# Patient Record
Sex: MALE | Race: ASIAN | URBAN - METROPOLITAN AREA
[De-identification: names, ages, dates, MRNs, and addresses within clinical notes are randomized per-mention and may not be internally consistent; named-entity substitution may affect disease eponyms.]

---

## 2024-03-16 ENCOUNTER — INPATIENT (INPATIENT)
Facility: HOSPITAL | Age: 74
LOS: 2 days | Discharge: HOME | DRG: 254 | End: 2024-03-19
Attending: STUDENT IN AN ORGANIZED HEALTH CARE EDUCATION/TRAINING PROGRAM | Admitting: INTERNAL MEDICINE
Payer: MEDICAID

## 2024-03-16 VITALS
HEART RATE: 92 BPM | WEIGHT: 128.09 LBS | SYSTOLIC BLOOD PRESSURE: 140 MMHG | DIASTOLIC BLOOD PRESSURE: 86 MMHG | RESPIRATION RATE: 20 BRPM | OXYGEN SATURATION: 95 % | TEMPERATURE: 98 F

## 2024-03-16 DIAGNOSIS — R07.9 CHEST PAIN, UNSPECIFIED: ICD-10-CM

## 2024-03-16 LAB
A1C WITH ESTIMATED AVERAGE GLUCOSE RESULT: 5.9 % — HIGH (ref 4–5.6)
ALBUMIN SERPL ELPH-MCNC: 4.8 G/DL — SIGNIFICANT CHANGE UP (ref 3.5–5.2)
ALP SERPL-CCNC: 95 U/L — SIGNIFICANT CHANGE UP (ref 30–115)
ALT FLD-CCNC: 28 U/L — SIGNIFICANT CHANGE UP (ref 0–41)
ANION GAP SERPL CALC-SCNC: 11 MMOL/L — SIGNIFICANT CHANGE UP (ref 7–14)
ANION GAP SERPL CALC-SCNC: 9 MMOL/L — SIGNIFICANT CHANGE UP (ref 7–14)
AST SERPL-CCNC: 33 U/L — SIGNIFICANT CHANGE UP (ref 0–41)
BASE EXCESS BLDV CALC-SCNC: 4.7 MMOL/L — HIGH (ref -2–3)
BASOPHILS # BLD AUTO: 0.01 K/UL — SIGNIFICANT CHANGE UP (ref 0–0.2)
BASOPHILS NFR BLD AUTO: 0.2 % — SIGNIFICANT CHANGE UP (ref 0–1)
BILIRUB SERPL-MCNC: 0.5 MG/DL — SIGNIFICANT CHANGE UP (ref 0.2–1.2)
BUN SERPL-MCNC: 11 MG/DL — SIGNIFICANT CHANGE UP (ref 10–20)
BUN SERPL-MCNC: 8 MG/DL — LOW (ref 10–20)
CA-I SERPL-SCNC: 1.19 MMOL/L — SIGNIFICANT CHANGE UP (ref 1.15–1.33)
CALCIUM SERPL-MCNC: 9 MG/DL — SIGNIFICANT CHANGE UP (ref 8.4–10.5)
CALCIUM SERPL-MCNC: 9.1 MG/DL — SIGNIFICANT CHANGE UP (ref 8.4–10.5)
CHLORIDE SERPL-SCNC: 101 MMOL/L — SIGNIFICANT CHANGE UP (ref 98–110)
CHLORIDE SERPL-SCNC: 102 MMOL/L — SIGNIFICANT CHANGE UP (ref 98–110)
CK MB CFR SERPL CALC: 2.9 NG/ML — SIGNIFICANT CHANGE UP (ref 0.6–6.3)
CK SERPL-CCNC: 78 U/L — SIGNIFICANT CHANGE UP (ref 0–225)
CO2 SERPL-SCNC: 28 MMOL/L — SIGNIFICANT CHANGE UP (ref 17–32)
CO2 SERPL-SCNC: 29 MMOL/L — SIGNIFICANT CHANGE UP (ref 17–32)
CREAT SERPL-MCNC: 0.5 MG/DL — LOW (ref 0.7–1.5)
CREAT SERPL-MCNC: 0.6 MG/DL — LOW (ref 0.7–1.5)
EGFR: 101 ML/MIN/1.73M2 — SIGNIFICANT CHANGE UP
EGFR: 106 ML/MIN/1.73M2 — SIGNIFICANT CHANGE UP
EOSINOPHIL # BLD AUTO: 0.12 K/UL — SIGNIFICANT CHANGE UP (ref 0–0.7)
EOSINOPHIL NFR BLD AUTO: 2 % — SIGNIFICANT CHANGE UP (ref 0–8)
ESTIMATED AVERAGE GLUCOSE: 123 MG/DL — HIGH (ref 68–114)
GAS PNL BLDV: 138 MMOL/L — SIGNIFICANT CHANGE UP (ref 136–145)
GAS PNL BLDV: SIGNIFICANT CHANGE UP
GLUCOSE BLDC GLUCOMTR-MCNC: 103 MG/DL — HIGH (ref 70–99)
GLUCOSE BLDC GLUCOMTR-MCNC: 104 MG/DL — HIGH (ref 70–99)
GLUCOSE BLDC GLUCOMTR-MCNC: 119 MG/DL — HIGH (ref 70–99)
GLUCOSE BLDC GLUCOMTR-MCNC: 138 MG/DL — HIGH (ref 70–99)
GLUCOSE SERPL-MCNC: 103 MG/DL — HIGH (ref 70–99)
GLUCOSE SERPL-MCNC: 117 MG/DL — HIGH (ref 70–99)
HCO3 BLDV-SCNC: 31 MMOL/L — HIGH (ref 22–29)
HCT VFR BLD CALC: 38.9 % — LOW (ref 42–52)
HCT VFR BLD CALC: 41.6 % — LOW (ref 42–52)
HCT VFR BLDA CALC: 42 % — SIGNIFICANT CHANGE UP (ref 39–51)
HCV AB S/CO SERPL IA: 0.04 COI — SIGNIFICANT CHANGE UP
HCV AB SERPL-IMP: SIGNIFICANT CHANGE UP
HGB BLD CALC-MCNC: 14 G/DL — SIGNIFICANT CHANGE UP (ref 12.6–17.4)
HGB BLD-MCNC: 13.4 G/DL — LOW (ref 14–18)
HGB BLD-MCNC: 13.8 G/DL — LOW (ref 14–18)
IMM GRANULOCYTES NFR BLD AUTO: 0.2 % — SIGNIFICANT CHANGE UP (ref 0.1–0.3)
LACTATE BLDV-MCNC: 1.4 MMOL/L — SIGNIFICANT CHANGE UP (ref 0.5–2)
LIDOCAIN IGE QN: 51 U/L — SIGNIFICANT CHANGE UP (ref 7–60)
LYMPHOCYTES # BLD AUTO: 1.59 K/UL — SIGNIFICANT CHANGE UP (ref 1.2–3.4)
LYMPHOCYTES # BLD AUTO: 26.1 % — SIGNIFICANT CHANGE UP (ref 20.5–51.1)
MCHC RBC-ENTMCNC: 31.4 PG — HIGH (ref 27–31)
MCHC RBC-ENTMCNC: 31.8 PG — HIGH (ref 27–31)
MCHC RBC-ENTMCNC: 33.2 G/DL — SIGNIFICANT CHANGE UP (ref 32–37)
MCHC RBC-ENTMCNC: 34.4 G/DL — SIGNIFICANT CHANGE UP (ref 32–37)
MCV RBC AUTO: 92.4 FL — SIGNIFICANT CHANGE UP (ref 80–94)
MCV RBC AUTO: 94.8 FL — HIGH (ref 80–94)
MONOCYTES # BLD AUTO: 0.54 K/UL — SIGNIFICANT CHANGE UP (ref 0.1–0.6)
MONOCYTES NFR BLD AUTO: 8.9 % — SIGNIFICANT CHANGE UP (ref 1.7–9.3)
NEUTROPHILS # BLD AUTO: 3.82 K/UL — SIGNIFICANT CHANGE UP (ref 1.4–6.5)
NEUTROPHILS NFR BLD AUTO: 62.6 % — SIGNIFICANT CHANGE UP (ref 42.2–75.2)
NRBC # BLD: 0 /100 WBCS — SIGNIFICANT CHANGE UP (ref 0–0)
NRBC # BLD: 0 /100 WBCS — SIGNIFICANT CHANGE UP (ref 0–0)
NT-PROBNP SERPL-SCNC: 83 PG/ML — SIGNIFICANT CHANGE UP (ref 0–300)
PCO2 BLDV: 51 MMHG — SIGNIFICANT CHANGE UP (ref 42–55)
PH BLDV: 7.39 — SIGNIFICANT CHANGE UP (ref 7.32–7.43)
PLATELET # BLD AUTO: 179 K/UL — SIGNIFICANT CHANGE UP (ref 130–400)
PLATELET # BLD AUTO: 184 K/UL — SIGNIFICANT CHANGE UP (ref 130–400)
PMV BLD: 9.7 FL — SIGNIFICANT CHANGE UP (ref 7.4–10.4)
PMV BLD: 9.8 FL — SIGNIFICANT CHANGE UP (ref 7.4–10.4)
PO2 BLDV: 51 MMHG — HIGH (ref 25–45)
POTASSIUM BLDV-SCNC: 3.4 MMOL/L — LOW (ref 3.5–5.1)
POTASSIUM SERPL-MCNC: 3.7 MMOL/L — SIGNIFICANT CHANGE UP (ref 3.5–5)
POTASSIUM SERPL-MCNC: 4.1 MMOL/L — SIGNIFICANT CHANGE UP (ref 3.5–5)
POTASSIUM SERPL-SCNC: 3.7 MMOL/L — SIGNIFICANT CHANGE UP (ref 3.5–5)
POTASSIUM SERPL-SCNC: 4.1 MMOL/L — SIGNIFICANT CHANGE UP (ref 3.5–5)
PROT SERPL-MCNC: 7.8 G/DL — SIGNIFICANT CHANGE UP (ref 6–8)
RBC # BLD: 4.21 M/UL — LOW (ref 4.7–6.1)
RBC # BLD: 4.39 M/UL — LOW (ref 4.7–6.1)
RBC # FLD: 12.7 % — SIGNIFICANT CHANGE UP (ref 11.5–14.5)
RBC # FLD: 12.8 % — SIGNIFICANT CHANGE UP (ref 11.5–14.5)
SAO2 % BLDV: 82.7 % — SIGNIFICANT CHANGE UP (ref 67–88)
SODIUM SERPL-SCNC: 140 MMOL/L — SIGNIFICANT CHANGE UP (ref 135–146)
SODIUM SERPL-SCNC: 140 MMOL/L — SIGNIFICANT CHANGE UP (ref 135–146)
TROPONIN T, HIGH SENSITIVITY RESULT: 10 NG/L — SIGNIFICANT CHANGE UP (ref 6–21)
TROPONIN T, HIGH SENSITIVITY RESULT: 11 NG/L — SIGNIFICANT CHANGE UP (ref 6–21)
TROPONIN T, HIGH SENSITIVITY RESULT: 12 NG/L — SIGNIFICANT CHANGE UP (ref 6–21)
WBC # BLD: 6.09 K/UL — SIGNIFICANT CHANGE UP (ref 4.8–10.8)
WBC # BLD: 8.02 K/UL — SIGNIFICANT CHANGE UP (ref 4.8–10.8)
WBC # FLD AUTO: 6.09 K/UL — SIGNIFICANT CHANGE UP (ref 4.8–10.8)
WBC # FLD AUTO: 8.02 K/UL — SIGNIFICANT CHANGE UP (ref 4.8–10.8)

## 2024-03-16 PROCEDURE — 86803 HEPATITIS C AB TEST: CPT

## 2024-03-16 PROCEDURE — 71275 CT ANGIOGRAPHY CHEST: CPT | Mod: 26,MC

## 2024-03-16 PROCEDURE — 74018 RADEX ABDOMEN 1 VIEW: CPT | Mod: 26

## 2024-03-16 PROCEDURE — 99285 EMERGENCY DEPT VISIT HI MDM: CPT

## 2024-03-16 PROCEDURE — 99222 1ST HOSP IP/OBS MODERATE 55: CPT

## 2024-03-16 PROCEDURE — 80048 BASIC METABOLIC PNL TOTAL CA: CPT

## 2024-03-16 PROCEDURE — 83735 ASSAY OF MAGNESIUM: CPT

## 2024-03-16 PROCEDURE — 82962 GLUCOSE BLOOD TEST: CPT

## 2024-03-16 PROCEDURE — 82553 CREATINE MB FRACTION: CPT

## 2024-03-16 PROCEDURE — 85025 COMPLETE CBC W/AUTO DIFF WBC: CPT

## 2024-03-16 PROCEDURE — 93010 ELECTROCARDIOGRAM REPORT: CPT

## 2024-03-16 PROCEDURE — 99223 1ST HOSP IP/OBS HIGH 75: CPT

## 2024-03-16 PROCEDURE — 85027 COMPLETE CBC AUTOMATED: CPT

## 2024-03-16 PROCEDURE — 93005 ELECTROCARDIOGRAM TRACING: CPT

## 2024-03-16 PROCEDURE — 71045 X-RAY EXAM CHEST 1 VIEW: CPT | Mod: 26

## 2024-03-16 PROCEDURE — 36415 COLL VENOUS BLD VENIPUNCTURE: CPT

## 2024-03-16 PROCEDURE — 80053 COMPREHEN METABOLIC PANEL: CPT

## 2024-03-16 PROCEDURE — 93306 TTE W/DOPPLER COMPLETE: CPT

## 2024-03-16 PROCEDURE — 74174 CTA ABD&PLVS W/CONTRAST: CPT | Mod: 26,MC

## 2024-03-16 PROCEDURE — 74018 RADEX ABDOMEN 1 VIEW: CPT

## 2024-03-16 PROCEDURE — 83036 HEMOGLOBIN GLYCOSYLATED A1C: CPT

## 2024-03-16 PROCEDURE — 82550 ASSAY OF CK (CPK): CPT

## 2024-03-16 PROCEDURE — 84484 ASSAY OF TROPONIN QUANT: CPT

## 2024-03-16 RX ORDER — ATORVASTATIN CALCIUM 80 MG/1
20 TABLET, FILM COATED ORAL AT BEDTIME
Refills: 0 | Status: DISCONTINUED | OUTPATIENT
Start: 2024-03-16 | End: 2024-03-19

## 2024-03-16 RX ORDER — ACETAMINOPHEN 500 MG
650 TABLET ORAL EVERY 6 HOURS
Refills: 0 | Status: DISCONTINUED | OUTPATIENT
Start: 2024-03-16 | End: 2024-03-19

## 2024-03-16 RX ORDER — PANTOPRAZOLE SODIUM 20 MG/1
40 TABLET, DELAYED RELEASE ORAL
Refills: 0 | Status: DISCONTINUED | OUTPATIENT
Start: 2024-03-16 | End: 2024-03-19

## 2024-03-16 RX ORDER — SODIUM CHLORIDE 9 MG/ML
1000 INJECTION, SOLUTION INTRAVENOUS
Refills: 0 | Status: DISCONTINUED | OUTPATIENT
Start: 2024-03-16 | End: 2024-03-19

## 2024-03-16 RX ORDER — DEXTROSE 50 % IN WATER 50 %
15 SYRINGE (ML) INTRAVENOUS ONCE
Refills: 0 | Status: DISCONTINUED | OUTPATIENT
Start: 2024-03-16 | End: 2024-03-19

## 2024-03-16 RX ORDER — INSULIN LISPRO 100/ML
VIAL (ML) SUBCUTANEOUS
Refills: 0 | Status: DISCONTINUED | OUTPATIENT
Start: 2024-03-16 | End: 2024-03-19

## 2024-03-16 RX ORDER — NITROGLYCERIN 6.5 MG
0.4 CAPSULE, EXTENDED RELEASE ORAL
Refills: 0 | Status: DISCONTINUED | OUTPATIENT
Start: 2024-03-16 | End: 2024-03-16

## 2024-03-16 RX ORDER — SENNA PLUS 8.6 MG/1
2 TABLET ORAL AT BEDTIME
Refills: 0 | Status: DISCONTINUED | OUTPATIENT
Start: 2024-03-16 | End: 2024-03-19

## 2024-03-16 RX ORDER — TAMSULOSIN HYDROCHLORIDE 0.4 MG/1
1 CAPSULE ORAL
Refills: 0 | DISCHARGE

## 2024-03-16 RX ORDER — TAMSULOSIN HYDROCHLORIDE 0.4 MG/1
0.4 CAPSULE ORAL AT BEDTIME
Refills: 0 | Status: DISCONTINUED | OUTPATIENT
Start: 2024-03-16 | End: 2024-03-19

## 2024-03-16 RX ORDER — LANOLIN ALCOHOL/MO/W.PET/CERES
3 CREAM (GRAM) TOPICAL AT BEDTIME
Refills: 0 | Status: DISCONTINUED | OUTPATIENT
Start: 2024-03-16 | End: 2024-03-19

## 2024-03-16 RX ORDER — DEXTROSE 50 % IN WATER 50 %
25 SYRINGE (ML) INTRAVENOUS ONCE
Refills: 0 | Status: DISCONTINUED | OUTPATIENT
Start: 2024-03-16 | End: 2024-03-19

## 2024-03-16 RX ORDER — LORATADINE 10 MG/1
10 TABLET ORAL DAILY
Refills: 0 | Status: DISCONTINUED | OUTPATIENT
Start: 2024-03-16 | End: 2024-03-19

## 2024-03-16 RX ORDER — HYDROXYZINE HCL 10 MG
1 TABLET ORAL
Refills: 0 | DISCHARGE

## 2024-03-16 RX ORDER — ATORVASTATIN CALCIUM 80 MG/1
1 TABLET, FILM COATED ORAL
Refills: 0 | DISCHARGE

## 2024-03-16 RX ORDER — ONDANSETRON 8 MG/1
4 TABLET, FILM COATED ORAL EVERY 8 HOURS
Refills: 0 | Status: DISCONTINUED | OUTPATIENT
Start: 2024-03-16 | End: 2024-03-19

## 2024-03-16 RX ORDER — POLYETHYLENE GLYCOL 3350 17 G/17G
17 POWDER, FOR SOLUTION ORAL EVERY 12 HOURS
Refills: 0 | Status: DISCONTINUED | OUTPATIENT
Start: 2024-03-16 | End: 2024-03-19

## 2024-03-16 RX ORDER — AMLODIPINE BESYLATE 2.5 MG/1
1 TABLET ORAL
Refills: 0 | DISCHARGE

## 2024-03-16 RX ORDER — SIMETHICONE 80 MG/1
80 TABLET, CHEWABLE ORAL EVERY 6 HOURS
Refills: 0 | Status: COMPLETED | OUTPATIENT
Start: 2024-03-16 | End: 2024-03-17

## 2024-03-16 RX ORDER — FAMOTIDINE 10 MG/ML
20 INJECTION INTRAVENOUS ONCE
Refills: 0 | Status: COMPLETED | OUTPATIENT
Start: 2024-03-16 | End: 2024-03-16

## 2024-03-16 RX ORDER — FAMOTIDINE 10 MG/ML
20 INJECTION INTRAVENOUS
Refills: 0 | Status: DISCONTINUED | OUTPATIENT
Start: 2024-03-16 | End: 2024-03-16

## 2024-03-16 RX ORDER — GLUCAGON INJECTION, SOLUTION 0.5 MG/.1ML
1 INJECTION, SOLUTION SUBCUTANEOUS ONCE
Refills: 0 | Status: DISCONTINUED | OUTPATIENT
Start: 2024-03-16 | End: 2024-03-19

## 2024-03-16 RX ORDER — GABAPENTIN 400 MG/1
100 CAPSULE ORAL
Refills: 0 | Status: DISCONTINUED | OUTPATIENT
Start: 2024-03-16 | End: 2024-03-19

## 2024-03-16 RX ORDER — LORATADINE 10 MG/1
1 TABLET ORAL
Refills: 0 | DISCHARGE

## 2024-03-16 RX ORDER — DEXTROSE 50 % IN WATER 50 %
12.5 SYRINGE (ML) INTRAVENOUS ONCE
Refills: 0 | Status: DISCONTINUED | OUTPATIENT
Start: 2024-03-16 | End: 2024-03-19

## 2024-03-16 RX ORDER — FAMOTIDINE 10 MG/ML
1 INJECTION INTRAVENOUS
Refills: 0 | DISCHARGE

## 2024-03-16 RX ORDER — AMLODIPINE BESYLATE 2.5 MG/1
2.5 TABLET ORAL EVERY 24 HOURS
Refills: 0 | Status: DISCONTINUED | OUTPATIENT
Start: 2024-03-16 | End: 2024-03-19

## 2024-03-16 RX ORDER — PREGABALIN 225 MG/1
1000 CAPSULE ORAL DAILY
Refills: 0 | Status: DISCONTINUED | OUTPATIENT
Start: 2024-03-16 | End: 2024-03-19

## 2024-03-16 RX ORDER — ENOXAPARIN SODIUM 100 MG/ML
40 INJECTION SUBCUTANEOUS EVERY 24 HOURS
Refills: 0 | Status: DISCONTINUED | OUTPATIENT
Start: 2024-03-16 | End: 2024-03-19

## 2024-03-16 RX ORDER — MORPHINE SULFATE 50 MG/1
4 CAPSULE, EXTENDED RELEASE ORAL ONCE
Refills: 0 | Status: DISCONTINUED | OUTPATIENT
Start: 2024-03-16 | End: 2024-03-16

## 2024-03-16 RX ORDER — ASPIRIN/CALCIUM CARB/MAGNESIUM 324 MG
81 TABLET ORAL DAILY
Refills: 0 | Status: DISCONTINUED | OUTPATIENT
Start: 2024-03-16 | End: 2024-03-19

## 2024-03-16 RX ORDER — FAMOTIDINE 10 MG/ML
20 INJECTION INTRAVENOUS DAILY
Refills: 0 | Status: DISCONTINUED | OUTPATIENT
Start: 2024-03-16 | End: 2024-03-19

## 2024-03-16 RX ORDER — ASPIRIN/CALCIUM CARB/MAGNESIUM 324 MG
1 TABLET ORAL
Refills: 0 | DISCHARGE

## 2024-03-16 RX ADMIN — ENOXAPARIN SODIUM 40 MILLIGRAM(S): 100 INJECTION SUBCUTANEOUS at 18:19

## 2024-03-16 RX ADMIN — SIMETHICONE 80 MILLIGRAM(S): 80 TABLET, CHEWABLE ORAL at 18:19

## 2024-03-16 RX ADMIN — AMLODIPINE BESYLATE 2.5 MILLIGRAM(S): 2.5 TABLET ORAL at 18:18

## 2024-03-16 RX ADMIN — PANTOPRAZOLE SODIUM 40 MILLIGRAM(S): 20 TABLET, DELAYED RELEASE ORAL at 18:19

## 2024-03-16 RX ADMIN — SIMETHICONE 80 MILLIGRAM(S): 80 TABLET, CHEWABLE ORAL at 23:38

## 2024-03-16 RX ADMIN — Medication 0.4 MILLIGRAM(S): at 03:13

## 2024-03-16 RX ADMIN — Medication 30 MILLILITER(S): at 23:38

## 2024-03-16 RX ADMIN — Medication 30 MILLILITER(S): at 09:49

## 2024-03-16 RX ADMIN — POLYETHYLENE GLYCOL 3350 17 GRAM(S): 17 POWDER, FOR SOLUTION ORAL at 18:24

## 2024-03-16 RX ADMIN — POLYETHYLENE GLYCOL 3350 17 GRAM(S): 17 POWDER, FOR SOLUTION ORAL at 09:49

## 2024-03-16 RX ADMIN — Medication 30 MILLILITER(S): at 12:27

## 2024-03-16 RX ADMIN — MORPHINE SULFATE 4 MILLIGRAM(S): 50 CAPSULE, EXTENDED RELEASE ORAL at 03:14

## 2024-03-16 RX ADMIN — FAMOTIDINE 20 MILLIGRAM(S): 10 INJECTION INTRAVENOUS at 12:25

## 2024-03-16 RX ADMIN — PANTOPRAZOLE SODIUM 40 MILLIGRAM(S): 20 TABLET, DELAYED RELEASE ORAL at 09:49

## 2024-03-16 RX ADMIN — Medication 81 MILLIGRAM(S): at 12:25

## 2024-03-16 RX ADMIN — SENNA PLUS 2 TABLET(S): 8.6 TABLET ORAL at 21:23

## 2024-03-16 RX ADMIN — ATORVASTATIN CALCIUM 20 MILLIGRAM(S): 80 TABLET, FILM COATED ORAL at 21:23

## 2024-03-16 RX ADMIN — SIMETHICONE 80 MILLIGRAM(S): 80 TABLET, CHEWABLE ORAL at 12:27

## 2024-03-16 RX ADMIN — TAMSULOSIN HYDROCHLORIDE 0.4 MILLIGRAM(S): 0.4 CAPSULE ORAL at 21:23

## 2024-03-16 RX ADMIN — Medication 30 MILLILITER(S): at 18:19

## 2024-03-16 RX ADMIN — GABAPENTIN 100 MILLIGRAM(S): 400 CAPSULE ORAL at 18:18

## 2024-03-16 RX ADMIN — FAMOTIDINE 20 MILLIGRAM(S): 10 INJECTION INTRAVENOUS at 03:14

## 2024-03-16 NOTE — ED ADULT NURSE REASSESSMENT NOTE - NS ED NURSE REASSESS COMMENT FT1
Samm  Emmanuel [id number], patient educated about plan of care and transfer to telemetry unit. Patient verbalized back understanding. Cardiac monitor continued. Awaiting transport. Patient said "I feel better".

## 2024-03-16 NOTE — ED PROVIDER NOTE - OBJECTIVE STATEMENT
73-year-old male past medical history hypertension, hyperlipidemia presenting to ED for evaluation of chest tightness, belching and burning sensation that got severe over the past 4 hours.  No modifying factors.  Denies associated fever, chills, shortness of breath, calf pain, vomiting.

## 2024-03-16 NOTE — ED PROVIDER NOTE - ATTENDING APP SHARED VISIT CONTRIBUTION OF CARE
73-year-old Chinese speaking male, history of DM, HTN, presents to ED with severe chest pain for the past 3 to 4 hours.  No fever or cough.  No abdominal pain.  Exam shows alert uncomfortable patient in no distress, HEENT NCAT PERRL, neck supple, lungs clear, RR S1S2, abdomen soft NT +BS, no CCE.

## 2024-03-16 NOTE — H&P ADULT - ASSESSMENT
3-year-old Chinese speaking male accompanied by his daughter at bed side c/o chest pain.,Pt with  history of DM, HTN, presents with severe chest pain for past 4 hours.  The onset was sudden . There were no precipitating factor . The was described as 9/10 mid sternal chest pain.    Chest pain:  TELE  Cardiology consult  TTE  Am EKg trend Cardiac enzymes 73-year-old Chinese speaking male accompanied by his daughter at bed side c/o chest pain and epigastric pain with bloating feeling.,Pt with  history of DM, HTN, presents with severe chest pain for past 4 hours.  The onset was sudden . There were no precipitating factor . The was described as 97/10 mid sternal chest pain.    Chest pain:  TELE  Am EKg trend Cardiac enzymes  Pt was given Nitro and fainted hold nitroglycerine SL    continue home meds as per PMD    Family will bring meds list and meds bottle this morning    HTN    DM    dyslipidemia:    pending  med list    Prophylaxis Gi/VTE    CAse D/W Dr Varela

## 2024-03-16 NOTE — H&P ADULT - NSHPPHYSICALEXAM_GEN_ALL_CORE
GENERAL:  74y/o Male NAD, resting comfortably.  HEAD:  Atraumatic, Normocephalic  EYES: EOMI, PERRLA, conjunctiva and sclera clear  NECK: Supple, No JVD, no cervical lymphadenopathy, non-tender  CHEST/LUNG: Clear to auscultation bilaterally; No wheeze, rhonchi, or rales  HEART: Regular rate and rhythm; S1&S2  ABDOMEN: Soft, Nontender, Nondistended x 4 quadrants; Bowel sounds present  EXTREMITIES:   Peripheral Pulses Present, No clubbing, no cyanosis, or no edema, no calf tenderness  PSYCH: AAOx3, cooperative, appropriate  NEUROLOGY: WNL  SKIN: WNL

## 2024-03-16 NOTE — H&P ADULT - NSHPLABSRESULTS_GEN_ALL_CORE
13.8   6.09  )-----------( 184      ( 16 Mar 2024 03:05 )             41.6       03-16    140  |  101  |  11  ----------------------------<  103<H>  4.1   |  28  |  0.6<L>    Ca    9.0      16 Mar 2024 03:05    TPro  7.8  /  Alb  4.8  /  TBili  0.5  /  DBili  x   /  AST  33  /  ALT  28  /  AlkPhos  95  03-16              Urinalysis Basic - ( 16 Mar 2024 03:05 )    Color: x / Appearance: x / SG: x / pH: x  Gluc: 103 mg/dL / Ketone: x  / Bili: x / Urobili: x   Blood: x / Protein: x / Nitrite: x   Leuk Esterase: x / RBC: x / WBC x   Sq Epi: x / Non Sq Epi: x / Bacteria: x            Lactate Trend            CAPILLARY BLOOD GLUCOSE

## 2024-03-16 NOTE — ED ADULT NURSE NOTE - CHPI ED NUR SYMPTOMS POS
Pt presents to ED with c/o chest pain with gas pain and burping. Daughter at bedside, states that pain started 3-4 hours ago./PAIN

## 2024-03-16 NOTE — ED ADULT NURSE REASSESSMENT NOTE - NS ED NURSE REASSESS COMMENT FT1
Pt presented to ED with severe chest pain and gas pain. Pt placed on cardiac monitor, IV line placed, and patient medicated as ordered. Pt had one episode of bradycardia to 35 and hypotension, pulse still palpable. Cardiac pads placed on patient, MD Gaspar and GISELA Wong evaluated pt at bedside. Pt's VS stable at this time, pt AOx3. Daughter at bedside.

## 2024-03-16 NOTE — CONSULT NOTE ADULT - SUBJECTIVE AND OBJECTIVE BOX
HPI:  73-year-old Chinese  speaking male accompanied by his daughter at bed side c/o chest pain and epigastric pain with bloating feeling.,Pt with  history of DM, HTN, presents with severe chest pain for past 4 hours.  The onset was sudden . There were no precipitating factor . The was described as 9/10 mid sternal chest pain.   .  Family will bring home medication list this morning . No information and  pending review. (16 Mar 2024 06:34)        HPI-Cardiology   Pt with the above Hx and HPI, evaluated at bedside. Pt chinese speaking and transalation done by kal at bedside. Pt c/o of felling bloated and "[place] Radiology tests and hospital records, were reviewed, as well as previous notes on this patient.      PAST MEDICAL & SURGICAL HISTORY  Hypertension    High cholesterol        FAMILY HISTORY:  FAMILY HISTORY:      SOCIAL HISTORY:  []smoker  []Alcohol  []Drug    ALLERGIES:  fish (Urticaria)  No Known Drug Allergies      MEDICATIONS:  MEDICATIONS  (STANDING):  aluminum hydroxide/magnesium hydroxide/simethicone Suspension 30 milliLiter(s) Oral every 6 hours  aspirin enteric coated 81 milliGRAM(s) Oral daily  dextrose 5%. 1000 milliLiter(s) (50 mL/Hr) IV Continuous <Continuous>  dextrose 5%. 1000 milliLiter(s) (100 mL/Hr) IV Continuous <Continuous>  dextrose 50% Injectable 25 Gram(s) IV Push once  dextrose 50% Injectable 12.5 Gram(s) IV Push once  dextrose 50% Injectable 25 Gram(s) IV Push once  enoxaparin Injectable 40 milliGRAM(s) SubCutaneous every 24 hours  famotidine    Tablet 20 milliGRAM(s) Oral daily  glucagon  Injectable 1 milliGRAM(s) IntraMuscular once  insulin lispro (ADMELOG) corrective regimen sliding scale   SubCutaneous three times a day before meals  pantoprazole    Tablet 40 milliGRAM(s) Oral two times a day  polyethylene glycol 3350 17 Gram(s) Oral every 12 hours  simethicone 80 milliGRAM(s) Chew every 6 hours    MEDICATIONS  (PRN):  acetaminophen     Tablet .. 650 milliGRAM(s) Oral every 6 hours PRN Temp greater or equal to 38C (100.4F), Mild Pain (1 - 3)  dextrose Oral Gel 15 Gram(s) Oral once PRN Blood Glucose LESS THAN 70 milliGRAM(s)/deciliter  melatonin 3 milliGRAM(s) Oral at bedtime PRN Insomnia  ondansetron Injectable 4 milliGRAM(s) IV Push every 8 hours PRN Nausea and/or Vomiting      HOME MEDICATIONS:  Home Medications:      VITALS:   T(F): 98 (03-16 @ 08:11), Max: 98 (03-16 @ 08:11)  HR: 91 (03-16 @ 08:11) (57 - 96)  BP: 144/92 (03-16 @ 08:11) (89/56 - 165/93)  BP(mean): --  RR: 18 (03-16 @ 08:11) (10 - 20)  SpO2: 97% (03-16 @ 08:11) (95% - 97%)    I&O's Summary      REVIEW OF SYSTEMS:  CONSTITUTIONAL: No weakness, fevers or chills  EYES: No visual changes  ENT: No vertigo or throat pain   NECK: No pain or stiffness  RESPIRATORY: No cough, wheezing, hemoptysis; No shortness of breath  CARDIOVASCULAR: No chest pain or palpitations  GASTROINTESTINAL: No abdominal or epigastric pain. No nausea, vomiting, or hematemesis; No diarrhea or constipation. No melena or hematochezia.  GENITOURINARY: No dysuria, frequency or hematuria  NEUROLOGICAL: No numbness or weakness  SKIN: No itching, no rashes  MSK: no    PHYSICAL EXAM:  NEURO: patient is awake , alert and oriented  GEN: Not in acute distress  NECK: no thyroid enlargement, no JVD  LUNGS: Clear to auscultation bilaterally   CARDIOVASCULAR: S1/S2 present, RRR , no murmurs or rubs, no carotid bruits,  + PP bilaterally  ABD: Soft, non-tender, non-distended, +BS  EXT: No FRANKLIN  SKIN: Intact    LABS:                        13.8   6.09  )-----------( 184      ( 16 Mar 2024 03:05 )             41.6     03-16    140  |  101  |  11  ----------------------------<  103<H>  4.1   |  28  |  0.6<L>    Ca    9.0      16 Mar 2024 03:05    TPro  7.8  /  Alb  4.8  /  TBili  0.5  /  DBili  x   /  AST  33  /  ALT  28  /  AlkPhos  95  03-16              Troponin trend:            RADIOLOGY:  -CXR:  -TTE:  -CCTA:  -STRESS TEST:  -CATHETERIZATION:    ECG:    TELEMETRY EVENTS:   HPI:  73-year-old Chinese  speaking male accompanied by his daughter at bed side c/o chest pain and epigastric pain with bloating feeling.,Pt with  history of DM, HTN, presents with severe chest pain for past 4 hours.  The onset was sudden . There were no precipitating factor . The was described as 9/10 mid sternal chest pain.   .  Family will bring home medication list this morning . No information and  pending review. (16 Mar 2024 06:34)        HPI-Cardiology   Pt with the above Hx and HPI, evaluated at bedside. Pt chinese speaking and translation done by daughter at bedside. Pt c/o of felling bloated and "something stuck in the stomach and chest". Also associated with burping and burning like sensation. Pt denies Hx of cardiac disease or workup. Radiology tests and hospital records, were reviewed, as well as previous notes on this patient.          PAST MEDICAL & SURGICAL HISTORY  Hypertension    High cholesterol            ALLERGIES:  fish (Urticaria)  No Known Drug Allergies      MEDICATIONS:  MEDICATIONS  (STANDING):  aluminum hydroxide/magnesium hydroxide/simethicone Suspension 30 milliLiter(s) Oral every 6 hours  aspirin enteric coated 81 milliGRAM(s) Oral daily  dextrose 5%. 1000 milliLiter(s) (50 mL/Hr) IV Continuous <Continuous>  dextrose 5%. 1000 milliLiter(s) (100 mL/Hr) IV Continuous <Continuous>  dextrose 50% Injectable 25 Gram(s) IV Push once  dextrose 50% Injectable 12.5 Gram(s) IV Push once  dextrose 50% Injectable 25 Gram(s) IV Push once  enoxaparin Injectable 40 milliGRAM(s) SubCutaneous every 24 hours  famotidine    Tablet 20 milliGRAM(s) Oral daily  glucagon  Injectable 1 milliGRAM(s) IntraMuscular once  insulin lispro (ADMELOG) corrective regimen sliding scale   SubCutaneous three times a day before meals  pantoprazole    Tablet 40 milliGRAM(s) Oral two times a day  polyethylene glycol 3350 17 Gram(s) Oral every 12 hours  simethicone 80 milliGRAM(s) Chew every 6 hours    MEDICATIONS  (PRN):  acetaminophen     Tablet .. 650 milliGRAM(s) Oral every 6 hours PRN Temp greater or equal to 38C (100.4F), Mild Pain (1 - 3)  dextrose Oral Gel 15 Gram(s) Oral once PRN Blood Glucose LESS THAN 70 milliGRAM(s)/deciliter  melatonin 3 milliGRAM(s) Oral at bedtime PRN Insomnia  ondansetron Injectable 4 milliGRAM(s) IV Push every 8 hours PRN Nausea and/or Vomiting      HOME MEDICATIONS:  Home Medications:          VITALS:   T(F): 98 (03-16 @ 08:11), Max: 98 (03-16 @ 08:11)  HR: 91 (03-16 @ 08:11) (57 - 96)  BP: 144/92 (03-16 @ 08:11) (89/56 - 165/93)  BP(mean): --  RR: 18 (03-16 @ 08:11) (10 - 20)  SpO2: 97% (03-16 @ 08:11) (95% - 97%)        REVIEW OF SYSTEMS:  See HPI        PHYSICAL EXAM:  NEURO: patient is awake , alert and oriented  GEN: Not in acute distress  NECK: no thyroid enlargement, no JVD  LUNGS: Clear to auscultation bilaterally   CARDIOVASCULAR: S1/S2 present, RRR , no murmurs or rubs, no carotid bruits,  + PP bilaterally  ABD: Soft, non-tender, non-distended, +BS  EXT: No FRANKLIN  SKIN: Intact        LABS:                        13.8   6.09  )-----------( 184      ( 16 Mar 2024 03:05 )             41.6     03-16    140  |  101  |  11  ----------------------------<  103<H>  4.1   |  28  |  0.6<L>    Ca    9.0      16 Mar 2024 03:05    TPro  7.8  /  Alb  4.8  /  TBili  0.5  /  DBili  x   /  AST  33  /  ALT  28  /  AlkPhos  95  03-16          RADIOLOGY:  -CXR:  < from: Xray Chest 1 View- PORTABLE-Urgent (03.16.24 @ 03:29) >    Impression:    Left basilar focal atelectasis, elevation of the left hemidiaphragm.        --- End of Report ---      < end of copied text >                < from: CT Angio Chest Aorta w/wo IV Cont (03.16.24 @ 04:53) >    IMPRESSION:  1.  No evidence of acute aortic syndromes specifically no evidence of   acute aortic dissection.  2.  No evidence of acute thoracic or abdominal pelvic pathology.  3.  Abdominal aortic aneurysm measuring up to 3.3 cm with eccentric   plaque.  4.  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  5.  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      --- End of Report ---    < end of copied text >      ECG:  < from: 12 Lead ECG (03.16.24 @ 03:25) >  Normal sinus rhythm  Minimal voltage criteria for LVH, may be normal variant  Borderline ECG    Confirmed by ZINA CAMPOS MD ([id number]) on 3/16/2024 8:49:32 AM    < end of copied text >     HPI:  73-year-old Chinese  speaking male accompanied by his daughter at bed side c/o chest pain and epigastric pain with bloating feeling and burpin, P.t with  history of DM, HTN, presents with severe chest pain for past 4 hours.  The onset was sudden . There were no precipitating factor . The was described as 9/10 mid sternal chest pain.   .  Family will bring home medication list this morning . No information and  pending review. (16 Mar 2024 06:34)        HPI-Cardiology   Pt with the above Hx and HPI, evaluated at bedside. Pt chinese speaking and translation done by daughter at bedside. Pt c/o of felling bloated and "something stuck in the stomach and chest". Also associated with burping and burning like sensation. Pt denies Hx of cardiac disease or workup. Radiology tests and hospital records, were reviewed, as well as previous notes on this patient.          PAST MEDICAL & SURGICAL HISTORY  Hypertension    High cholesterol            ALLERGIES:  fish (Urticaria)  No Known Drug Allergies      MEDICATIONS:  MEDICATIONS  (STANDING):  aluminum hydroxide/magnesium hydroxide/simethicone Suspension 30 milliLiter(s) Oral every 6 hours  aspirin enteric coated 81 milliGRAM(s) Oral daily  dextrose 5%. 1000 milliLiter(s) (50 mL/Hr) IV Continuous <Continuous>  dextrose 5%. 1000 milliLiter(s) (100 mL/Hr) IV Continuous <Continuous>  dextrose 50% Injectable 25 Gram(s) IV Push once  dextrose 50% Injectable 12.5 Gram(s) IV Push once  dextrose 50% Injectable 25 Gram(s) IV Push once  enoxaparin Injectable 40 milliGRAM(s) SubCutaneous every 24 hours  famotidine    Tablet 20 milliGRAM(s) Oral daily  glucagon  Injectable 1 milliGRAM(s) IntraMuscular once  insulin lispro (ADMELOG) corrective regimen sliding scale   SubCutaneous three times a day before meals  pantoprazole    Tablet 40 milliGRAM(s) Oral two times a day  polyethylene glycol 3350 17 Gram(s) Oral every 12 hours  simethicone 80 milliGRAM(s) Chew every 6 hours    MEDICATIONS  (PRN):  acetaminophen     Tablet .. 650 milliGRAM(s) Oral every 6 hours PRN Temp greater or equal to 38C (100.4F), Mild Pain (1 - 3)  dextrose Oral Gel 15 Gram(s) Oral once PRN Blood Glucose LESS THAN 70 milliGRAM(s)/deciliter  melatonin 3 milliGRAM(s) Oral at bedtime PRN Insomnia  ondansetron Injectable 4 milliGRAM(s) IV Push every 8 hours PRN Nausea and/or Vomiting      HOME MEDICATIONS:  Home Medications:          VITALS:   T(F): 98 (03-16 @ 08:11), Max: 98 (03-16 @ 08:11)  HR: 91 (03-16 @ 08:11) (57 - 96)  BP: 144/92 (03-16 @ 08:11) (89/56 - 165/93)  BP(mean): --  RR: 18 (03-16 @ 08:11) (10 - 20)  SpO2: 97% (03-16 @ 08:11) (95% - 97%)        REVIEW OF SYSTEMS:  See HPI        PHYSICAL EXAM:  NEURO: patient is awake , alert and oriented  GEN: Not in acute distress  NECK: no thyroid enlargement, no JVD  LUNGS: Clear to auscultation bilaterally   CARDIOVASCULAR: S1/S2 present, RRR , no murmurs or rubs, no carotid bruits,  + PP bilaterally  ABD: Soft, non-tender, non-distended, +BS  EXT: No FRANKLIN  SKIN: Intact        LABS:                        13.8   6.09  )-----------( 184      ( 16 Mar 2024 03:05 )             41.6     03-16    140  |  101  |  11  ----------------------------<  103<H>  4.1   |  28  |  0.6<L>    Ca    9.0      16 Mar 2024 03:05    TPro  7.8  /  Alb  4.8  /  TBili  0.5  /  DBili  x   /  AST  33  /  ALT  28  /  AlkPhos  95  03-16          RADIOLOGY:  -CXR:  < from: Xray Chest 1 View- PORTABLE-Urgent (03.16.24 @ 03:29) >    Impression:    Left basilar focal atelectasis, elevation of the left hemidiaphragm.        --- End of Report ---      < end of copied text >                < from: CT Angio Chest Aorta w/wo IV Cont (03.16.24 @ 04:53) >    IMPRESSION:  1.  No evidence of acute aortic syndromes specifically no evidence of   acute aortic dissection.  2.  No evidence of acute thoracic or abdominal pelvic pathology.  3.  Abdominal aortic aneurysm measuring up to 3.3 cm with eccentric   plaque.  4.  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  5.  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      --- End of Report ---    < end of copied text >      ECG:  < from: 12 Lead ECG (03.16.24 @ 03:25) >  Normal sinus rhythm  Minimal voltage criteria for LVH, may be normal variant  Borderline ECG    Confirmed by ZINA CAMPOS MD ([id number]) on 3/16/2024 8:49:32 AM    < end of copied text >

## 2024-03-16 NOTE — CHART NOTE - NSCHARTNOTEFT_GEN_A_CORE
Patient seen at bedside, daughter present to assist with information gathering and translation. Of note, I saw patient when he first arrived to ER and he was withering in pain. Subsequently seen after ER assessment and treatment, seems to be more comfortable. Daughter says patient has had problem with burping and "gas" which gets "stuck" in mid chest for a week. Was given a medication by his primary doctor which was not helping (Daughter will bring in all of his medications for verification). In the ER he received NTG which apparently caused bradycardia but also morphine which did help. In addition to above daughter says patient with left sided weakness / numbness which developed after he had surgery following a fall in 2015 (patient advised to call for help whenever he has to get up). Will do serial cardiac enzymes but etiology of pain may be gastrointestinal.

## 2024-03-16 NOTE — ED PROVIDER NOTE - CLINICAL SUMMARY MEDICAL DECISION MAKING FREE TEXT BOX
73-year-old Chinese speaking male, history of DM, HTN, presents with severe chest pain for past 4 hours.  Exam unremarkable.  Labs noted for WBC 6, hemoglobin 13.8, lipase 51, BNP 83, troponin 12, 11.  EKG normal sinus rhythm no ST elevation.  Chest x-ray no acute disease.  CTA dissection study shows no dissection.  Given Pepcid, morphine and nitroglycerin with improvement.  Will admit to telemetry.

## 2024-03-16 NOTE — PATIENT PROFILE ADULT - VISION (WITH CORRECTIVE LENSES IF THE PATIENT USUALLY WEARS THEM):
Anemia Partially impaired: cannot see medication labels or newsprint, but can see obstacles in path, and the surrounding layout; can count fingers at arm's length

## 2024-03-16 NOTE — ED ADULT TRIAGE NOTE - ESI TRIAGE ACUITY LEVEL, MLM
[FreeTextEntry1] : Will reduce isosorbide & change dosing schedule  to QHS only to avoid side effects.  Maintain on other meds.  If still has headaches when taking Isosorbide QHS, she is to discontinue this altogether and stay on all other meds prescribed.  To maintain a low sodium diet. 2

## 2024-03-16 NOTE — ED ADULT NURSE NOTE - NSFALLUNIVINTERV_ED_ALL_ED
Bed/Stretcher in lowest position, wheels locked, appropriate side rails in place/Call bell, personal items and telephone in reach/Instruct patient to call for assistance before getting out of bed/chair/stretcher/Non-slip footwear applied when patient is off stretcher/[place] to call system/Physically safe environment - no spills, clutter or unnecessary equipment/Purposeful proactive rounding/Room/bathroom lighting operational, light cord in reach

## 2024-03-16 NOTE — ED PROVIDER NOTE - PHYSICAL EXAMINATION
GENERAL: Well-nourished, Well-developed. NAD.  HEAD: No visible or palpable bumps or hematomas. No ecchymosis behind ears B/L.  CVS: No reproducible chest wall tenderness. Normal S1,S2. No murmurs appreciated on auscultation   RESP: No use of accessory muscles. Chest rise symmetrical with good expansion. Lungs clear to auscultation B/L. No wheezing, rales, or rhonchi auscultated.  GI: Normal auscultation of bowel sounds in all 4 quadrants. Soft, Nontender, Nondistended. No guarding or rebound tenderness. No CVAT B/L.  Skin: Warm, Dry. No rashes or lesions. Good cap refill < 2 sec B/L.  EXT: Radial and pedal pulses present B/L. No calf tenderness or swelling B/L. No palpable cords. No pedal edema B/L.

## 2024-03-16 NOTE — PROGRESS NOTE ADULT - SUBJECTIVE AND OBJECTIVE BOX
SUBJECTIVE:    Patient is a 73y old Male who presents with a chief complaint of cp (16 Mar 2024 10:37)      HPI:  73-year-old Chinese  speaking male accompanied by his daughter at bed side c/o chest pain and epigastric pain with bloating feeling.,Pt with  history of DM, HTN, presents with severe chest pain for past 4 hours.  The onset was sudden . There were no precipitating factor . The was described as 9/10 mid sternal chest pain.   .  Family will bring home medication list this morning . No information and  pending review. (16 Mar 2024 06:34)      Currently admitted to medicine with the primary diagnosis of Chest pain    feels much better today, having some mild nausea and burping, not endorsing chest pain this AM    Besides the pertinent positives and negatives described above, the ROS was within normal limits.    PAST MEDICAL & SURGICAL HISTORY  Hypertension    High cholesterol      SOCIAL HISTORY:    ALLERGIES:  fish (Urticaria)  No Known Drug Allergies    MEDICATIONS:  STANDING MEDICATIONS  aluminum hydroxide/magnesium hydroxide/simethicone Suspension 30 milliLiter(s) Oral every 6 hours  aspirin enteric coated 81 milliGRAM(s) Oral daily  atorvastatin 20 milliGRAM(s) Oral at bedtime  cyanocobalamin 1000 MICROGram(s) Oral daily  dextrose 5%. 1000 milliLiter(s) IV Continuous <Continuous>  dextrose 5%. 1000 milliLiter(s) IV Continuous <Continuous>  dextrose 50% Injectable 25 Gram(s) IV Push once  dextrose 50% Injectable 25 Gram(s) IV Push once  dextrose 50% Injectable 12.5 Gram(s) IV Push once  enoxaparin Injectable 40 milliGRAM(s) SubCutaneous every 24 hours  famotidine    Tablet 20 milliGRAM(s) Oral daily  glucagon  Injectable 1 milliGRAM(s) IntraMuscular once  insulin lispro (ADMELOG) corrective regimen sliding scale   SubCutaneous three times a day before meals  loratadine 10 milliGRAM(s) Oral daily  pantoprazole    Tablet 40 milliGRAM(s) Oral two times a day  polyethylene glycol 3350 17 Gram(s) Oral every 12 hours  senna 2 Tablet(s) Oral at bedtime  simethicone 80 milliGRAM(s) Chew every 6 hours  tamsulosin 0.4 milliGRAM(s) Oral at bedtime    PRN MEDICATIONS  acetaminophen     Tablet .. 650 milliGRAM(s) Oral every 6 hours PRN  dextrose Oral Gel 15 Gram(s) Oral once PRN  melatonin 3 milliGRAM(s) Oral at bedtime PRN  ondansetron Injectable 4 milliGRAM(s) IV Push every 8 hours PRN    VITALS:   T(F): 98  HR: 91  BP: 144/92  RR: 18  SpO2: 97%    LABS:                        13.4   8.02  )-----------( 179      ( 16 Mar 2024 11:28 )             38.9     03-16    140  |  102  |  8<L>  ----------------------------<  117<H>  3.7   |  29  |  0.5<L>    Ca    9.1      16 Mar 2024 11:28    TPro  7.8  /  Alb  4.8  /  TBili  0.5  /  DBili  x   /  AST  33  /  ALT  28  /  AlkPhos  95  03-16      Urinalysis Basic - ( 16 Mar 2024 11:28 )    Color: x / Appearance: x / SG: x / pH: x  Gluc: 117 mg/dL / Ketone: x  / Bili: x / Urobili: x   Blood: x / Protein: x / Nitrite: x   Leuk Esterase: x / RBC: x / WBC x   Sq Epi: x / Non Sq Epi: x / Bacteria: x        Creatine Kinase, Serum: 78 U/L (03-16-24 @ 11:28)      CARDIAC MARKERS ( 16 Mar 2024 11:28 )  x     / x     / 78 U/L / x     / 2.9 ng/mL      Chest pain      RADIOLOGY:    PHYSICAL EXAM:  General: WN/WD NAD  Neurology: A&Ox3, nonfocal, FERREIRA x 4  Head:  Normocephalic, atraumatic  ENT:  Mucosa moist, no ulcerations  Neck:  Supple, no sinuses or palpable masses  Lymphatic:  No palpable cervical, supraclavicular, axillary or inguinal adenopathy  Respiratory: CTA B/L  CV: RRR, S1S2, no murmur  Abdominal: Soft, NT, ND no palpable mass  MSK: No edema  Incisions: intact, no erythema or drainage    Intravenous access: yes  NG tube: no  Griffin Catheter: no

## 2024-03-16 NOTE — PROGRESS NOTE ADULT - ASSESSMENT
73-year-old Chinese speaking male accompanied by his daughter at bed side c/o chest pain and epigastric pain with bloating feeling.,Pt with  history of DM, HTN, presents with severe chest pain for past 4 hours.  The onset was sudden . There were no precipitating factor . The was described as 9/10 mid sternal chest pain    Assessment    ·	Atypical chest pain likely secondary to GERD vs. 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 and s/p cervical spine surgery  ·	HTN  ·	DM  ·	BPH  ·	Seasonal allergies    Plan    - c/w protonix 40 po bid for now as he states it helps him at home / complaining of food sometimes getting stuck and staying in his midchest / c/w famotidine here, f/u GI  - discussed syncope with cardio, will keep on tele for now and may need outpatient MCOT, f/u orthostatics, echo  - his pmd just started him on gabapentin however he hasn't had it yet (for his left sided pain) will start the low dose 100 bid  - amlodipine 2.5 qd  - insulin if fs > 180  - tamsulosin  - loratadine    Pending: GI follow up, tele for syncope    # DVT PPX: lovenox 73-year-old Chinese speaking male accompanied by his daughter at bed side c/o chest pain and epigastric pain with bloating feeling.,Pt with  history of DM, HTN, presents with severe chest pain for past 4 hours.  The onset was sudden . There were no precipitating factor . The was described as 9/10 mid sternal chest pain    Assessment    ·	Atypical chest pain likely secondary to GERD vs. 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 and s/p cervical spine surgery  ·	Constipation  ·	HTN  ·	DM  ·	BPH  ·	Seasonal allergies    Plan    - c/w protonix 40 po bid for now as he states it helps him at home / complaining of food sometimes getting stuck and staying in his midchest / c/w famotidine here, f/u GI  - discussed syncope with cardio, will keep on tele for now and may need outpatient MCOT, f/u orthostatics, echo  - his pmd just started him on gabapentin however he hasn't had it yet (for his left sided pain) will start the low dose 100 bid  - amlodipine 2.5 qd  - had a bowel movement yesterday but has been hard, will start on miralax  - insulin if fs > 180  - tamsulosin  - loratadine    Pending: GI follow up, tele for syncope    # DVT PPX: lovenox

## 2024-03-16 NOTE — ED ADULT NURSE REASSESSMENT NOTE - NS ED NURSE REASSESS COMMENT FT1
Upon arrival to CT scan, Pt's daughter states that he might have an allergy to fish.  called (#[id number]), pt stated that he sometimes "feels itchy" after eating fish. Pt unsure if he ever had contrast. MD Gaspar called and informed. stated pt OK to get contrast. Pt received IV contrast with any issue, no allergic reaction noted.

## 2024-03-17 LAB
ANION GAP SERPL CALC-SCNC: 11 MMOL/L — SIGNIFICANT CHANGE UP (ref 7–14)
BUN SERPL-MCNC: 9 MG/DL — LOW (ref 10–20)
CALCIUM SERPL-MCNC: 8.7 MG/DL — SIGNIFICANT CHANGE UP (ref 8.4–10.5)
CHLORIDE SERPL-SCNC: 103 MMOL/L — SIGNIFICANT CHANGE UP (ref 98–110)
CO2 SERPL-SCNC: 27 MMOL/L — SIGNIFICANT CHANGE UP (ref 17–32)
CREAT SERPL-MCNC: 0.5 MG/DL — LOW (ref 0.7–1.5)
EGFR: 106 ML/MIN/1.73M2 — SIGNIFICANT CHANGE UP
GLUCOSE BLDC GLUCOMTR-MCNC: 102 MG/DL — HIGH (ref 70–99)
GLUCOSE BLDC GLUCOMTR-MCNC: 135 MG/DL — HIGH (ref 70–99)
GLUCOSE BLDC GLUCOMTR-MCNC: 89 MG/DL — SIGNIFICANT CHANGE UP (ref 70–99)
GLUCOSE SERPL-MCNC: 94 MG/DL — SIGNIFICANT CHANGE UP (ref 70–99)
HCT VFR BLD CALC: 38.2 % — LOW (ref 42–52)
HGB BLD-MCNC: 12.9 G/DL — LOW (ref 14–18)
MAGNESIUM SERPL-MCNC: 2.7 MG/DL — HIGH (ref 1.8–2.4)
MCHC RBC-ENTMCNC: 31.9 PG — HIGH (ref 27–31)
MCHC RBC-ENTMCNC: 33.8 G/DL — SIGNIFICANT CHANGE UP (ref 32–37)
MCV RBC AUTO: 94.6 FL — HIGH (ref 80–94)
NRBC # BLD: 0 /100 WBCS — SIGNIFICANT CHANGE UP (ref 0–0)
PLATELET # BLD AUTO: 178 K/UL — SIGNIFICANT CHANGE UP (ref 130–400)
PMV BLD: 9.9 FL — SIGNIFICANT CHANGE UP (ref 7.4–10.4)
POTASSIUM SERPL-MCNC: 3.7 MMOL/L — SIGNIFICANT CHANGE UP (ref 3.5–5)
POTASSIUM SERPL-SCNC: 3.7 MMOL/L — SIGNIFICANT CHANGE UP (ref 3.5–5)
RBC # BLD: 4.04 M/UL — LOW (ref 4.7–6.1)
RBC # FLD: 12.7 % — SIGNIFICANT CHANGE UP (ref 11.5–14.5)
SODIUM SERPL-SCNC: 141 MMOL/L — SIGNIFICANT CHANGE UP (ref 135–146)
WBC # BLD: 5.55 K/UL — SIGNIFICANT CHANGE UP (ref 4.8–10.8)
WBC # FLD AUTO: 5.55 K/UL — SIGNIFICANT CHANGE UP (ref 4.8–10.8)

## 2024-03-17 PROCEDURE — 99232 SBSQ HOSP IP/OBS MODERATE 35: CPT

## 2024-03-17 PROCEDURE — 99223 1ST HOSP IP/OBS HIGH 75: CPT

## 2024-03-17 RX ORDER — POTASSIUM CHLORIDE 20 MEQ
40 PACKET (EA) ORAL ONCE
Refills: 0 | Status: COMPLETED | OUTPATIENT
Start: 2024-03-17 | End: 2024-03-17

## 2024-03-17 RX ORDER — LIDOCAINE 4 G/100G
1 CREAM TOPICAL EVERY 24 HOURS
Refills: 0 | Status: DISCONTINUED | OUTPATIENT
Start: 2024-03-17 | End: 2024-03-19

## 2024-03-17 RX ADMIN — ENOXAPARIN SODIUM 40 MILLIGRAM(S): 100 INJECTION SUBCUTANEOUS at 17:29

## 2024-03-17 RX ADMIN — AMLODIPINE BESYLATE 2.5 MILLIGRAM(S): 2.5 TABLET ORAL at 12:13

## 2024-03-17 RX ADMIN — PANTOPRAZOLE SODIUM 40 MILLIGRAM(S): 20 TABLET, DELAYED RELEASE ORAL at 06:41

## 2024-03-17 RX ADMIN — LORATADINE 10 MILLIGRAM(S): 10 TABLET ORAL at 12:14

## 2024-03-17 RX ADMIN — PREGABALIN 1000 MICROGRAM(S): 225 CAPSULE ORAL at 12:14

## 2024-03-17 RX ADMIN — ATORVASTATIN CALCIUM 20 MILLIGRAM(S): 80 TABLET, FILM COATED ORAL at 22:57

## 2024-03-17 RX ADMIN — Medication 10 MILLIGRAM(S): at 10:36

## 2024-03-17 RX ADMIN — Medication 81 MILLIGRAM(S): at 12:13

## 2024-03-17 RX ADMIN — FAMOTIDINE 20 MILLIGRAM(S): 10 INJECTION INTRAVENOUS at 12:14

## 2024-03-17 RX ADMIN — POLYETHYLENE GLYCOL 3350 17 GRAM(S): 17 POWDER, FOR SOLUTION ORAL at 17:29

## 2024-03-17 RX ADMIN — SENNA PLUS 2 TABLET(S): 8.6 TABLET ORAL at 22:56

## 2024-03-17 RX ADMIN — Medication 40 MILLIEQUIVALENT(S): at 10:31

## 2024-03-17 RX ADMIN — Medication 30 MILLILITER(S): at 06:41

## 2024-03-17 RX ADMIN — LIDOCAINE 1 PATCH: 4 CREAM TOPICAL at 22:57

## 2024-03-17 RX ADMIN — POLYETHYLENE GLYCOL 3350 17 GRAM(S): 17 POWDER, FOR SOLUTION ORAL at 06:51

## 2024-03-17 RX ADMIN — SIMETHICONE 80 MILLIGRAM(S): 80 TABLET, CHEWABLE ORAL at 06:41

## 2024-03-17 RX ADMIN — GABAPENTIN 100 MILLIGRAM(S): 400 CAPSULE ORAL at 17:29

## 2024-03-17 RX ADMIN — Medication 30 MILLILITER(S): at 17:29

## 2024-03-17 RX ADMIN — GABAPENTIN 100 MILLIGRAM(S): 400 CAPSULE ORAL at 06:41

## 2024-03-17 RX ADMIN — PANTOPRAZOLE SODIUM 40 MILLIGRAM(S): 20 TABLET, DELAYED RELEASE ORAL at 17:29

## 2024-03-17 RX ADMIN — TAMSULOSIN HYDROCHLORIDE 0.4 MILLIGRAM(S): 0.4 CAPSULE ORAL at 22:56

## 2024-03-17 RX ADMIN — Medication 30 MILLILITER(S): at 10:31

## 2024-03-17 NOTE — CONSULT NOTE ADULT - SUBJECTIVE AND OBJECTIVE BOX
Chief complaint/Reason for consult:    HPI:  73-year-old Chinese  speaking male accompanied by his daughter at bed side c/o chest pain and epigastric pain with bloating feeling.,Pt with  history of DM, HTN, presents with severe chest pain for past 4 hours.  The onset was sudden . There were no precipitating factor . The was described as 9/10 mid sternal chest pain.  PAST MEDICAL & SURGICAL HISTORY:  Hypertension  DM    High cholesterol              Family history:  FAMILY HISTORY:    No GI cancers in first or second degree relatives    Social History: No smoking. No alcohol. No illegal drug use.    Allergies:  fish  No Known Drug Allergies      MEDICATIONS:  MEDICATIONS  (STANDING):  aluminum hydroxide/magnesium hydroxide/simethicone Suspension 30 milliLiter(s) Oral every 6 hours  amLODIPine   Tablet 2.5 milliGRAM(s) Oral every 24 hours  aspirin enteric coated 81 milliGRAM(s) Oral daily  atorvastatin 20 milliGRAM(s) Oral at bedtime  cyanocobalamin 1000 MICROGram(s) Oral daily  dextrose 5%. 1000 milliLiter(s) (50 mL/Hr) IV Continuous <Continuous>  dextrose 5%. 1000 milliLiter(s) (100 mL/Hr) IV Continuous <Continuous>  dextrose 50% Injectable 12.5 Gram(s) IV Push once  dextrose 50% Injectable 25 Gram(s) IV Push once  dextrose 50% Injectable 25 Gram(s) IV Push once  enoxaparin Injectable 40 milliGRAM(s) SubCutaneous every 24 hours  famotidine    Tablet 20 milliGRAM(s) Oral daily  gabapentin 100 milliGRAM(s) Oral two times a day  glucagon  Injectable 1 milliGRAM(s) IntraMuscular once  insulin lispro (ADMELOG) corrective regimen sliding scale   SubCutaneous three times a day before meals  loratadine 10 milliGRAM(s) Oral daily  pantoprazole    Tablet 40 milliGRAM(s) Oral two times a day  polyethylene glycol 3350 17 Gram(s) Oral every 12 hours  senna 2 Tablet(s) Oral at bedtime  tamsulosin 0.4 milliGRAM(s) Oral at bedtime    MEDICATIONS  (PRN):  acetaminophen     Tablet .. 650 milliGRAM(s) Oral every 6 hours PRN Temp greater or equal to 38C (100.4F), Mild Pain (1 - 3)  dextrose Oral Gel 15 Gram(s) Oral once PRN Blood Glucose LESS THAN 70 milliGRAM(s)/deciliter  melatonin 3 milliGRAM(s) Oral at bedtime PRN Insomnia  ondansetron Injectable 4 milliGRAM(s) IV Push every 8 hours PRN Nausea and/or Vomiting    REVIEW OF SYSTEMS  General:  No weight loss, fevers, or chills.  Eyes:  No reported pain or visual changes  ENT:  No sore throat or runny nose.  CV:  + chest pain, No palpitations.  Resp:  No shortness of breath, cough, wheezing or hemoptysis  GI:  + epigastric abdominal pain, no nausea, vomiting, dysphagia, diarrhea but has constipation. No rectal bleeding, melena, or hematemesis.  Muscle:  No aches or weakness  Endocrine:  No polyuria, polydipsia or cold/heat intolerance  Heme:  No ecchymosis or easy bruisability  All other review of systems is negative unless indicated above.    VITALS:   T(F): 97.8 (03-17-24 @ 14:34), Max: 98.3 (03-17-24 @ 04:40)  HR: 62 (03-17-24 @ 14:34) (62 - 71)  BP: 121/76 (03-17-24 @ 14:34) (113/76 - 125/82)  RR: 16 (03-17-24 @ 14:34) (16 - 18)  SpO2: 96% (03-17-24 @ 14:34) (96% - 96%)    PHYSICAL EXAM:  GENERAL: AAOx3, no acute distress.  HEAD:  Atraumatic, Normocephalic  EYES: EOMI, PERRLA, conjunctiva and sclera clear  NECK: Supple, no JVD or thyromegaly  NODES: No palpable cervical or supraclavicular lymphadenopathy   CHEST/LUNG: Clear to auscultation bilaterally;  HEART: Regular rate and rhythm; normal S1, S2  ABDOMEN: Soft, nontender, nondistended;   NEUROLOGY: No asterixis or tremor.   SKIN: Intact, no jaundice  EXTREMITIES: warm, no periph edema.  Rectal exam: not done.      LABS:  03-17    141  |  103  |  9<L>  ----------------------------<  94  3.7   |  27  |  0.5<L>    Ca    8.7      17 Mar 2024 07:09  Mg     2.7     03-17    TPro  7.8  /  Alb  4.8  /  TBili  0.5  /  DBili  x   /  AST  33  /  ALT  28  /  AlkPhos  95  03-16                          12.9   5.55  )-----------( 178      ( 17 Mar 2024 07:09 )             38.2     LIVER FUNCTIONS - ( 16 Mar 2024 03:05 )  Alb: 4.8 g/dL / Pro: 7.8 g/dL / ALK PHOS: 95 U/L / ALT: 28 U/L / AST: 33 U/L / GGT: x               IMAGING:  < from: CT Angio Abdomen and Pelvis w/ IV Cont (03.16.24 @ 04:53) >  IMPRESSION:  1.  No evidence of acute aortic syndromes specifically no evidence of   acute aortic dissection.  2.  No evidence of acute thoracic or abdominal pelvic pathology.  3.  Abdominal aortic aneurysm measuring up to 3.3 cm with eccentric   plaque.  4.  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  5.  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      --- End of Report --    < end of copied text >  < from: Xray Kidney Ureter Bladder (03.16.24 @ 13:36) >  FINDINGS:    TUBES/LINES: None.    PERITONEUM/MESENTERY/BOWEL: Moderate colonic stool burden with stool   pattern overlying the right colon and rectum.    BONES/SOFT TISSUES: Degenerative changes of the spine.      IMPRESSION:    Moderate colonic stool burden.    < end of copied text >          Old medical records were reviewed through [facility], including medical notes, labs, pathology results, radiographic and endoscopic images.

## 2024-03-17 NOTE — PROGRESS NOTE ADULT - ASSESSMENT
73-year-old Chinese speaking male accompanied by his daughter at bed side c/o chest pain and epigastric pain with bloating feeling.,Pt with  history of DM, HTN, presents with severe chest pain for past 4 hours.  The onset was sudden . There were no precipitating factor . The was described as 9/10 mid sternal chest pain    Assessment    ·	Atypical chest pain likely secondary to GERD vs. 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 and s/p cervical spine surgery  ·	Constipation  ·	HTN  ·	DM  ·	BPH  ·	Seasonal allergies    Plan    - c/w protonix 40 po bid for now as he states it helps him at home / complaining of food sometimes getting stuck and staying in his midchest / c/w famotidine here, f/u GI  - discussed syncope with cardio, will keep on tele for now and may need outpatient MCOT, orthostatics negative, echo  - gabapentin 100 bid (started as outpatient recently)  - amlodipine 2.5 qd  - had a bowel movement yesterday but has been hard, will start on miralax  - insulin if fs > 180  - tamsulosin  - loratadine    Pending: GI follow up, tele for syncope    # DVT PPX: lovenox 73-year-old Chinese speaking male accompanied by his daughter at bed side c/o chest pain and epigastric pain with bloating feeling.,Pt with  history of DM, HTN, presents with severe chest pain for past 4 hours.  The onset was sudden . There were no precipitating factor . The was described as 9/10 mid sternal chest pain    Assessment    ·	Atypical chest pain likely secondary to GERD vs. 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 past and s/p cervical spine surgery  ·	Constipation  ·	HTN  ·	DM  ·	BPH  ·	Seasonal allergies    Plan    - c/w protonix 40 po bid for now as he states it helps him at home / complaining of food sometimes getting stuck and staying in his midchest / c/w famotidine here, f/u GI, will give standing maalox for gas  - will add bisacodyl suppository, c/w miralax and senna / moderate stool burden on xray  - discussed syncope with cardio, will keep on tele for now and may need outpatient MCOT, orthostatics negative, f/u echo, had 5 beats NSVT   - gabapentin 100 bid (started as outpatient recently)  - amlodipine 2.5 qd  - insulin if fs > 180  - tamsulosin  - loratadine    Pending: GI follow up, tele for syncope, f/u echo, BM    # DVT PPX: lovenox

## 2024-03-17 NOTE — PROGRESS NOTE ADULT - SUBJECTIVE AND OBJECTIVE BOX
SUBJECTIVE:    Patient is a 73y old Male who presents with a chief complaint of cp (16 Mar 2024 12:27)      HPI:  73-year-old Chinese  speaking male accompanied by his daughter at bed side c/o chest pain and epigastric pain with bloating feeling.,Pt with  history of DM, HTN, presents with severe chest pain for past 4 hours.  The onset was sudden . There were no precipitating factor . The was described as 9/10 mid sternal chest pain.   .  Family will bring home medication list this morning . No information and  pending review. (16 Mar 2024 06:34)      Currently admitted to medicine with the primary diagnosis of Chest pain         Besides the pertinent positives and negatives described above, the ROS was within normal limits.    PAST MEDICAL & SURGICAL HISTORY  Hypertension    High cholesterol      SOCIAL HISTORY:    ALLERGIES:  fish (Urticaria)  No Known Drug Allergies    MEDICATIONS:  STANDING MEDICATIONS  amLODIPine   Tablet 2.5 milliGRAM(s) Oral every 24 hours  aspirin enteric coated 81 milliGRAM(s) Oral daily  atorvastatin 20 milliGRAM(s) Oral at bedtime  cyanocobalamin 1000 MICROGram(s) Oral daily  dextrose 5%. 1000 milliLiter(s) IV Continuous <Continuous>  dextrose 5%. 1000 milliLiter(s) IV Continuous <Continuous>  dextrose 50% Injectable 25 Gram(s) IV Push once  dextrose 50% Injectable 25 Gram(s) IV Push once  dextrose 50% Injectable 12.5 Gram(s) IV Push once  enoxaparin Injectable 40 milliGRAM(s) SubCutaneous every 24 hours  famotidine    Tablet 20 milliGRAM(s) Oral daily  gabapentin 100 milliGRAM(s) Oral two times a day  glucagon  Injectable 1 milliGRAM(s) IntraMuscular once  insulin lispro (ADMELOG) corrective regimen sliding scale   SubCutaneous three times a day before meals  loratadine 10 milliGRAM(s) Oral daily  pantoprazole    Tablet 40 milliGRAM(s) Oral two times a day  polyethylene glycol 3350 17 Gram(s) Oral every 12 hours  senna 2 Tablet(s) Oral at bedtime  tamsulosin 0.4 milliGRAM(s) Oral at bedtime    PRN MEDICATIONS  acetaminophen     Tablet .. 650 milliGRAM(s) Oral every 6 hours PRN  dextrose Oral Gel 15 Gram(s) Oral once PRN  melatonin 3 milliGRAM(s) Oral at bedtime PRN  ondansetron Injectable 4 milliGRAM(s) IV Push every 8 hours PRN    VITALS:   T(F): 98.3  HR: 66  BP: 113/76  RR: 18  SpO2: 96%    LABS:                        13.4   8.02  )-----------( 179      ( 16 Mar 2024 11:28 )             38.9     03-16    140  |  102  |  8<L>  ----------------------------<  117<H>  3.7   |  29  |  0.5<L>    Ca    9.1      16 Mar 2024 11:28    TPro  7.8  /  Alb  4.8  /  TBili  0.5  /  DBili  x   /  AST  33  /  ALT  28  /  AlkPhos  95  03-16      Urinalysis Basic - ( 16 Mar 2024 11:28 )    Color: x / Appearance: x / SG: x / pH: x  Gluc: 117 mg/dL / Ketone: x  / Bili: x / Urobili: x   Blood: x / Protein: x / Nitrite: x   Leuk Esterase: x / RBC: x / WBC x   Sq Epi: x / Non Sq Epi: x / Bacteria: x        Creatine Kinase, Serum: 78 U/L (03-16-24 @ 11:28)      CARDIAC MARKERS ( 16 Mar 2024 11:28 )  x     / x     / 78 U/L / x     / 2.9 ng/mL      Chest pain      RADIOLOGY:    PHYSICAL EXAM:  General: WN/WD NAD  Neurology: A&Ox3, nonfocal, FERREIRA x 4  Head:  Normocephalic, atraumatic  ENT:  Mucosa moist, no ulcerations  Neck:  Supple, no sinuses or palpable masses  Lymphatic:  No palpable cervical, supraclavicular, axillary or inguinal adenopathy  Respiratory: CTA B/L  CV: RRR, S1S2, no murmur  Abdominal: Soft, NT, ND no palpable mass  MSK: No edema, + peripheral pulses  Incisions: intact, no erythema or drainage    Intravenous access: yes  NG tube: no  Griffin Catheter: no        SUBJECTIVE:    Patient is a 73y old Male who presents with a chief complaint of cp (16 Mar 2024 12:27)      HPI:  73-year-old Chinese  speaking male accompanied by his daughter at bed side c/o chest pain and epigastric pain with bloating feeling.,Pt with  history of DM, HTN, presents with severe chest pain for past 4 hours.  The onset was sudden . There were no precipitating factor . The was described as 9/10 mid sternal chest pain.   .  Family will bring home medication list this morning . No information and  pending review. (16 Mar 2024 06:34)      Currently admitted to medicine with the primary diagnosis of Chest pain     having a lot of burping, did not have a BM yesterday    Besides the pertinent positives and negatives described above, the ROS was within normal limits.    PAST MEDICAL & SURGICAL HISTORY  Hypertension    High cholesterol      SOCIAL HISTORY:    ALLERGIES:  fish (Urticaria)  No Known Drug Allergies    MEDICATIONS:  STANDING MEDICATIONS  amLODIPine   Tablet 2.5 milliGRAM(s) Oral every 24 hours  aspirin enteric coated 81 milliGRAM(s) Oral daily  atorvastatin 20 milliGRAM(s) Oral at bedtime  cyanocobalamin 1000 MICROGram(s) Oral daily  dextrose 5%. 1000 milliLiter(s) IV Continuous <Continuous>  dextrose 5%. 1000 milliLiter(s) IV Continuous <Continuous>  dextrose 50% Injectable 25 Gram(s) IV Push once  dextrose 50% Injectable 25 Gram(s) IV Push once  dextrose 50% Injectable 12.5 Gram(s) IV Push once  enoxaparin Injectable 40 milliGRAM(s) SubCutaneous every 24 hours  famotidine    Tablet 20 milliGRAM(s) Oral daily  gabapentin 100 milliGRAM(s) Oral two times a day  glucagon  Injectable 1 milliGRAM(s) IntraMuscular once  insulin lispro (ADMELOG) corrective regimen sliding scale   SubCutaneous three times a day before meals  loratadine 10 milliGRAM(s) Oral daily  pantoprazole    Tablet 40 milliGRAM(s) Oral two times a day  polyethylene glycol 3350 17 Gram(s) Oral every 12 hours  senna 2 Tablet(s) Oral at bedtime  tamsulosin 0.4 milliGRAM(s) Oral at bedtime    PRN MEDICATIONS  acetaminophen     Tablet .. 650 milliGRAM(s) Oral every 6 hours PRN  dextrose Oral Gel 15 Gram(s) Oral once PRN  melatonin 3 milliGRAM(s) Oral at bedtime PRN  ondansetron Injectable 4 milliGRAM(s) IV Push every 8 hours PRN    VITALS:   T(F): 98.3  HR: 66  BP: 113/76  RR: 18  SpO2: 96%    LABS:                        13.4   8.02  )-----------( 179      ( 16 Mar 2024 11:28 )             38.9     03-16    140  |  102  |  8<L>  ----------------------------<  117<H>  3.7   |  29  |  0.5<L>    Ca    9.1      16 Mar 2024 11:28    TPro  7.8  /  Alb  4.8  /  TBili  0.5  /  DBili  x   /  AST  33  /  ALT  28  /  AlkPhos  95  03-16      Urinalysis Basic - ( 16 Mar 2024 11:28 )    Color: x / Appearance: x / SG: x / pH: x  Gluc: 117 mg/dL / Ketone: x  / Bili: x / Urobili: x   Blood: x / Protein: x / Nitrite: x   Leuk Esterase: x / RBC: x / WBC x   Sq Epi: x / Non Sq Epi: x / Bacteria: x        Creatine Kinase, Serum: 78 U/L (03-16-24 @ 11:28)      CARDIAC MARKERS ( 16 Mar 2024 11:28 )  x     / x     / 78 U/L / x     / 2.9 ng/mL      Chest pain      RADIOLOGY:    PHYSICAL EXAM:  General: WN/WD NAD  Neurology: A&Ox3, nonfocal, FERREIRA x 4  Head:  Normocephalic, atraumatic  ENT:  Mucosa moist, no ulcerations  Neck:  Supple, no sinuses or palpable masses  Lymphatic:  No palpable cervical, supraclavicular, axillary or inguinal adenopathy  Respiratory: CTA B/L  CV: RRR, S1S2, no murmur  Abdominal: Soft, NT, ND no palpable mass  MSK: No edema, + peripheral pulses  Incisions: intact, no erythema or drainage    Intravenous access: yes  NG tube: no  Griffin Catheter: no

## 2024-03-18 LAB
ALBUMIN SERPL ELPH-MCNC: 4 G/DL — SIGNIFICANT CHANGE UP (ref 3.5–5.2)
ALP SERPL-CCNC: 90 U/L — SIGNIFICANT CHANGE UP (ref 30–115)
ALT FLD-CCNC: 20 U/L — SIGNIFICANT CHANGE UP (ref 0–41)
ANION GAP SERPL CALC-SCNC: 6 MMOL/L — LOW (ref 7–14)
AST SERPL-CCNC: 19 U/L — SIGNIFICANT CHANGE UP (ref 0–41)
BASOPHILS # BLD AUTO: 0.01 K/UL — SIGNIFICANT CHANGE UP (ref 0–0.2)
BASOPHILS NFR BLD AUTO: 0.2 % — SIGNIFICANT CHANGE UP (ref 0–1)
BILIRUB SERPL-MCNC: 0.8 MG/DL — SIGNIFICANT CHANGE UP (ref 0.2–1.2)
BUN SERPL-MCNC: 10 MG/DL — SIGNIFICANT CHANGE UP (ref 10–20)
CALCIUM SERPL-MCNC: 8.6 MG/DL — SIGNIFICANT CHANGE UP (ref 8.4–10.5)
CHLORIDE SERPL-SCNC: 101 MMOL/L — SIGNIFICANT CHANGE UP (ref 98–110)
CO2 SERPL-SCNC: 29 MMOL/L — SIGNIFICANT CHANGE UP (ref 17–32)
CREAT SERPL-MCNC: 0.6 MG/DL — LOW (ref 0.7–1.5)
EGFR: 101 ML/MIN/1.73M2 — SIGNIFICANT CHANGE UP
EOSINOPHIL # BLD AUTO: 0.05 K/UL — SIGNIFICANT CHANGE UP (ref 0–0.7)
EOSINOPHIL NFR BLD AUTO: 1.2 % — SIGNIFICANT CHANGE UP (ref 0–8)
GLUCOSE BLDC GLUCOMTR-MCNC: 120 MG/DL — HIGH (ref 70–99)
GLUCOSE BLDC GLUCOMTR-MCNC: 152 MG/DL — HIGH (ref 70–99)
GLUCOSE BLDC GLUCOMTR-MCNC: 88 MG/DL — SIGNIFICANT CHANGE UP (ref 70–99)
GLUCOSE BLDC GLUCOMTR-MCNC: 94 MG/DL — SIGNIFICANT CHANGE UP (ref 70–99)
GLUCOSE SERPL-MCNC: 94 MG/DL — SIGNIFICANT CHANGE UP (ref 70–99)
HCT VFR BLD CALC: 38 % — LOW (ref 42–52)
HGB BLD-MCNC: 12.8 G/DL — LOW (ref 14–18)
IMM GRANULOCYTES NFR BLD AUTO: 0.2 % — SIGNIFICANT CHANGE UP (ref 0.1–0.3)
LYMPHOCYTES # BLD AUTO: 0.62 K/UL — LOW (ref 1.2–3.4)
LYMPHOCYTES # BLD AUTO: 15 % — LOW (ref 20.5–51.1)
MAGNESIUM SERPL-MCNC: 2.7 MG/DL — HIGH (ref 1.8–2.4)
MCHC RBC-ENTMCNC: 31.6 PG — HIGH (ref 27–31)
MCHC RBC-ENTMCNC: 33.7 G/DL — SIGNIFICANT CHANGE UP (ref 32–37)
MCV RBC AUTO: 93.8 FL — SIGNIFICANT CHANGE UP (ref 80–94)
MONOCYTES # BLD AUTO: 0.42 K/UL — SIGNIFICANT CHANGE UP (ref 0.1–0.6)
MONOCYTES NFR BLD AUTO: 10.2 % — HIGH (ref 1.7–9.3)
NEUTROPHILS # BLD AUTO: 3.02 K/UL — SIGNIFICANT CHANGE UP (ref 1.4–6.5)
NEUTROPHILS NFR BLD AUTO: 73.2 % — SIGNIFICANT CHANGE UP (ref 42.2–75.2)
NRBC # BLD: 0 /100 WBCS — SIGNIFICANT CHANGE UP (ref 0–0)
PLATELET # BLD AUTO: 156 K/UL — SIGNIFICANT CHANGE UP (ref 130–400)
PMV BLD: 9.6 FL — SIGNIFICANT CHANGE UP (ref 7.4–10.4)
POTASSIUM SERPL-MCNC: 4.2 MMOL/L — SIGNIFICANT CHANGE UP (ref 3.5–5)
POTASSIUM SERPL-SCNC: 4.2 MMOL/L — SIGNIFICANT CHANGE UP (ref 3.5–5)
PROT SERPL-MCNC: 6.4 G/DL — SIGNIFICANT CHANGE UP (ref 6–8)
RBC # BLD: 4.05 M/UL — LOW (ref 4.7–6.1)
RBC # FLD: 12.5 % — SIGNIFICANT CHANGE UP (ref 11.5–14.5)
SODIUM SERPL-SCNC: 136 MMOL/L — SIGNIFICANT CHANGE UP (ref 135–146)
WBC # BLD: 4.13 K/UL — LOW (ref 4.8–10.8)
WBC # FLD AUTO: 4.13 K/UL — LOW (ref 4.8–10.8)

## 2024-03-18 PROCEDURE — 99232 SBSQ HOSP IP/OBS MODERATE 35: CPT

## 2024-03-18 PROCEDURE — 99233 SBSQ HOSP IP/OBS HIGH 50: CPT

## 2024-03-18 PROCEDURE — 93306 TTE W/DOPPLER COMPLETE: CPT | Mod: 26

## 2024-03-18 RX ADMIN — POLYETHYLENE GLYCOL 3350 17 GRAM(S): 17 POWDER, FOR SOLUTION ORAL at 05:48

## 2024-03-18 RX ADMIN — GABAPENTIN 100 MILLIGRAM(S): 400 CAPSULE ORAL at 05:48

## 2024-03-18 RX ADMIN — PANTOPRAZOLE SODIUM 40 MILLIGRAM(S): 20 TABLET, DELAYED RELEASE ORAL at 05:49

## 2024-03-18 RX ADMIN — ENOXAPARIN SODIUM 40 MILLIGRAM(S): 100 INJECTION SUBCUTANEOUS at 18:00

## 2024-03-18 RX ADMIN — ATORVASTATIN CALCIUM 20 MILLIGRAM(S): 80 TABLET, FILM COATED ORAL at 21:55

## 2024-03-18 RX ADMIN — LIDOCAINE 1 PATCH: 4 CREAM TOPICAL at 08:23

## 2024-03-18 RX ADMIN — POLYETHYLENE GLYCOL 3350 17 GRAM(S): 17 POWDER, FOR SOLUTION ORAL at 17:17

## 2024-03-18 RX ADMIN — TAMSULOSIN HYDROCHLORIDE 0.4 MILLIGRAM(S): 0.4 CAPSULE ORAL at 21:55

## 2024-03-18 RX ADMIN — Medication 30 MILLILITER(S): at 00:01

## 2024-03-18 RX ADMIN — PANTOPRAZOLE SODIUM 40 MILLIGRAM(S): 20 TABLET, DELAYED RELEASE ORAL at 17:17

## 2024-03-18 RX ADMIN — GABAPENTIN 100 MILLIGRAM(S): 400 CAPSULE ORAL at 17:17

## 2024-03-18 RX ADMIN — Medication 30 MILLILITER(S): at 22:17

## 2024-03-18 RX ADMIN — LIDOCAINE 1 PATCH: 4 CREAM TOPICAL at 10:15

## 2024-03-18 RX ADMIN — FAMOTIDINE 20 MILLIGRAM(S): 10 INJECTION INTRAVENOUS at 11:37

## 2024-03-18 RX ADMIN — LORATADINE 10 MILLIGRAM(S): 10 TABLET ORAL at 11:25

## 2024-03-18 RX ADMIN — Medication 30 MILLILITER(S): at 05:49

## 2024-03-18 RX ADMIN — LIDOCAINE 1 PATCH: 4 CREAM TOPICAL at 21:57

## 2024-03-18 RX ADMIN — AMLODIPINE BESYLATE 2.5 MILLIGRAM(S): 2.5 TABLET ORAL at 12:13

## 2024-03-18 RX ADMIN — Medication 2: at 12:20

## 2024-03-18 RX ADMIN — Medication 30 MILLILITER(S): at 16:43

## 2024-03-18 RX ADMIN — Medication 81 MILLIGRAM(S): at 11:25

## 2024-03-18 RX ADMIN — PREGABALIN 1000 MICROGRAM(S): 225 CAPSULE ORAL at 11:25

## 2024-03-18 RX ADMIN — Medication 30 MILLILITER(S): at 11:25

## 2024-03-18 NOTE — CONSULT NOTE ADULT - ASSESSMENT
73-year-old Chinese  speaking male accompanied by his daughter at bed side c/o chest pain and epigastric pain with bloating feeling.,Pt with  history of DM, HTN, presents with severe chest pain for past 4 hours.  The onset was sudden . There were no precipitating factor . The was described as 9/10 mid sternal chest pain. ruled out for ACS.    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  Risks and benefits discussed  
  #Abdominal aortic aneurysm measuring up to 3.3 cm with eccentric   plaque.  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   - case D/W vascular fellow, Dr. Link: no acute vascular intervention warranted at this time   - may F/U as outpt with Dr. Scott for further recommendations  
73-year-old Chinese  speaking male accompanied by his daughter at bed side c/o chest pain and epigastric pain with bloating feeling      Impression:  #Chest pain: ACS ruled out  #HTN/DM      Atypical presentation for ACS. Appears GI related in nature   Trop flat x2  ECG: NS, non ischemic  Cxr: Unremarkable  CTA Chest aorta noted         Plan:  GI eval  Check lipid profile, HGA1C  Obtain TTE  Will need ischemic workup, which can bed done as outpatient

## 2024-03-18 NOTE — PROGRESS NOTE ADULT - SUBJECTIVE AND OBJECTIVE BOX
SUBJECTIVE:    Patient is a 73y old Male who presents with a chief complaint of cp (17 Mar 2024 17:27)      HPI:  73-year-old Chinese  speaking male accompanied by his daughter at bed side c/o chest pain and epigastric pain with bloating feeling.,Pt with  history of DM, HTN, presents with severe chest pain for past 4 hours.  The onset was sudden . There were no precipitating factor . The was described as 9/10 mid sternal chest pain.   .  Family will bring home medication list this morning . No information and  pending review. (16 Mar 2024 06:34)      Currently admitted to medicine with the primary diagnosis of Chest pain         Besides the pertinent positives and negatives described above, the ROS was within normal limits.    PAST MEDICAL & SURGICAL HISTORY  Hypertension    High cholesterol      SOCIAL HISTORY:    ALLERGIES:  fish (Urticaria)  No Known Drug Allergies    MEDICATIONS:  STANDING MEDICATIONS  aluminum hydroxide/magnesium hydroxide/simethicone Suspension 30 milliLiter(s) Oral every 6 hours  amLODIPine   Tablet 2.5 milliGRAM(s) Oral every 24 hours  aspirin enteric coated 81 milliGRAM(s) Oral daily  atorvastatin 20 milliGRAM(s) Oral at bedtime  cyanocobalamin 1000 MICROGram(s) Oral daily  dextrose 5%. 1000 milliLiter(s) IV Continuous <Continuous>  dextrose 5%. 1000 milliLiter(s) IV Continuous <Continuous>  dextrose 50% Injectable 25 Gram(s) IV Push once  dextrose 50% Injectable 12.5 Gram(s) IV Push once  dextrose 50% Injectable 25 Gram(s) IV Push once  enoxaparin Injectable 40 milliGRAM(s) SubCutaneous every 24 hours  famotidine    Tablet 20 milliGRAM(s) Oral daily  gabapentin 100 milliGRAM(s) Oral two times a day  glucagon  Injectable 1 milliGRAM(s) IntraMuscular once  insulin lispro (ADMELOG) corrective regimen sliding scale   SubCutaneous three times a day before meals  lidocaine   4% Patch 1 Patch Transdermal every 24 hours  loratadine 10 milliGRAM(s) Oral daily  pantoprazole    Tablet 40 milliGRAM(s) Oral two times a day  polyethylene glycol 3350 17 Gram(s) Oral every 12 hours  senna 2 Tablet(s) Oral at bedtime  tamsulosin 0.4 milliGRAM(s) Oral at bedtime    PRN MEDICATIONS  acetaminophen     Tablet .. 650 milliGRAM(s) Oral every 6 hours PRN  dextrose Oral Gel 15 Gram(s) Oral once PRN  melatonin 3 milliGRAM(s) Oral at bedtime PRN  ondansetron Injectable 4 milliGRAM(s) IV Push every 8 hours PRN    VITALS:   T(F): 98.1  HR: 68  BP: 119/80  RR: 18  SpO2: 95%    LABS:                        12.9   5.55  )-----------( 178      ( 17 Mar 2024 07:09 )             38.2     03-17    141  |  103  |  9<L>  ----------------------------<  94  3.7   |  27  |  0.5<L>    Ca    8.7      17 Mar 2024 07:09  Mg     2.7     03-17        Urinalysis Basic - ( 17 Mar 2024 07:09 )    Color: x / Appearance: x / SG: x / pH: x  Gluc: 94 mg/dL / Ketone: x  / Bili: x / Urobili: x   Blood: x / Protein: x / Nitrite: x   Leuk Esterase: x / RBC: x / WBC x   Sq Epi: x / Non Sq Epi: x / Bacteria: x            CARDIAC MARKERS ( 16 Mar 2024 11:28 )  x     / x     / 78 U/L / x     / 2.9 ng/mL      Chest pain      RADIOLOGY:    PHYSICAL EXAM:  General: WN/WD NAD  Neurology: A&Ox3, nonfocal, FERREIRA x 4  Head:  Normocephalic, atraumatic  ENT:  Mucosa moist, no ulcerations  Neck:  Supple, no sinuses or palpable masses  Lymphatic:  No palpable cervical, supraclavicular, axillary or inguinal adenopathy  Respiratory: CTA B/L  CV: RRR, S1S2, no murmur  Abdominal: Soft, NT, ND no palpable mass  MSK: No edema, + peripheral pulses  Incisions: intact, no erythema or drainage    Intravenous access:   NG tube:   Griffin Catheter:        SUBJECTIVE:    Patient is a 73y old Male who presents with a chief complaint of cp (17 Mar 2024 17:27)      HPI:  73-year-old Chinese  speaking male accompanied by his daughter at bed side c/o chest pain and epigastric pain with bloating feeling.,Pt with  history of DM, HTN, presents with severe chest pain for past 4 hours.  The onset was sudden . There were no precipitating factor . The was described as 9/10 mid sternal chest pain.   .  Family will bring home medication list this morning . No information and  pending review. (16 Mar 2024 06:34)      Currently admitted to medicine with the primary diagnosis of Chest pain     still having a lot of gas, able to tolerate diet    Besides the pertinent positives and negatives described above, the ROS was within normal limits.    PAST MEDICAL & SURGICAL HISTORY  Hypertension    High cholesterol      SOCIAL HISTORY:    ALLERGIES:  fish (Urticaria)  No Known Drug Allergies    MEDICATIONS:  STANDING MEDICATIONS  aluminum hydroxide/magnesium hydroxide/simethicone Suspension 30 milliLiter(s) Oral every 6 hours  amLODIPine   Tablet 2.5 milliGRAM(s) Oral every 24 hours  aspirin enteric coated 81 milliGRAM(s) Oral daily  atorvastatin 20 milliGRAM(s) Oral at bedtime  cyanocobalamin 1000 MICROGram(s) Oral daily  dextrose 5%. 1000 milliLiter(s) IV Continuous <Continuous>  dextrose 5%. 1000 milliLiter(s) IV Continuous <Continuous>  dextrose 50% Injectable 25 Gram(s) IV Push once  dextrose 50% Injectable 12.5 Gram(s) IV Push once  dextrose 50% Injectable 25 Gram(s) IV Push once  enoxaparin Injectable 40 milliGRAM(s) SubCutaneous every 24 hours  famotidine    Tablet 20 milliGRAM(s) Oral daily  gabapentin 100 milliGRAM(s) Oral two times a day  glucagon  Injectable 1 milliGRAM(s) IntraMuscular once  insulin lispro (ADMELOG) corrective regimen sliding scale   SubCutaneous three times a day before meals  lidocaine   4% Patch 1 Patch Transdermal every 24 hours  loratadine 10 milliGRAM(s) Oral daily  pantoprazole    Tablet 40 milliGRAM(s) Oral two times a day  polyethylene glycol 3350 17 Gram(s) Oral every 12 hours  senna 2 Tablet(s) Oral at bedtime  tamsulosin 0.4 milliGRAM(s) Oral at bedtime    PRN MEDICATIONS  acetaminophen     Tablet .. 650 milliGRAM(s) Oral every 6 hours PRN  dextrose Oral Gel 15 Gram(s) Oral once PRN  melatonin 3 milliGRAM(s) Oral at bedtime PRN  ondansetron Injectable 4 milliGRAM(s) IV Push every 8 hours PRN    VITALS:   T(F): 98.1  HR: 68  BP: 119/80  RR: 18  SpO2: 95%    LABS:                        12.9   5.55  )-----------( 178      ( 17 Mar 2024 07:09 )             38.2     03-17    141  |  103  |  9<L>  ----------------------------<  94  3.7   |  27  |  0.5<L>    Ca    8.7      17 Mar 2024 07:09  Mg     2.7     03-17        Urinalysis Basic - ( 17 Mar 2024 07:09 )    Color: x / Appearance: x / SG: x / pH: x  Gluc: 94 mg/dL / Ketone: x  / Bili: x / Urobili: x   Blood: x / Protein: x / Nitrite: x   Leuk Esterase: x / RBC: x / WBC x   Sq Epi: x / Non Sq Epi: x / Bacteria: x            CARDIAC MARKERS ( 16 Mar 2024 11:28 )  x     / x     / 78 U/L / x     / 2.9 ng/mL      Chest pain      RADIOLOGY:    PHYSICAL EXAM:  General: WN/WD NAD  Neurology: A&Ox3, nonfocal, FERREIRA x 4  Head:  Normocephalic, atraumatic  ENT:  Mucosa moist, no ulcerations  Neck:  Supple, no sinuses or palpable masses  Lymphatic:  No palpable cervical, supraclavicular, axillary or inguinal adenopathy  Respiratory: CTA B/L  CV: RRR, S1S2, no murmur  Abdominal: Soft, NT, ND no palpable mass  MSK: No edema, + peripheral pulses  Incisions: intact, no erythema or drainage    Intravenous access: yes  NG tube: no  Griffin Catheter: no

## 2024-03-18 NOTE — PROGRESS NOTE ADULT - ASSESSMENT
73-year-old Chinese  speaking male accompanied by his daughter at bed side c/o chest pain and epigastric pain with bloating feeling.,Pt with  history of DM, HTN, presents with severe chest pain for past 4 hours.  The onset was sudden . There were no precipitating factor . The was described as 9/10 mid sternal chest pain. ruled out for ACS.    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  Rec  -vascular eval    #Constipation  Needs bowel regimen  Needs colonoscopy to be done out pt through [facility] as well.  Risks and benefits discussed   73-year-old Chinese  speaking male accompanied by his daughter at bed side c/o chest pain and epigastric pain with bloating feeling.,Pt with  history of DM, HTN, presents with severe chest pain for past 4 hours.  The onset was sudden . There were no precipitating factor . The was described as 9/10 mid sternal chest pain. ruled out for ACS.    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  Rec  -vascular eval    #Constipation  Needs bowel regimen  Needs colonoscopy to be done out pt through [facility] as well.  Risks and benefits discussed

## 2024-03-18 NOTE — CONSULT NOTE ADULT - NS ATTEND AMEND GEN_ALL_CORE FT
Patient was seen and examined and history obtained through the patient's daughter who acted as a . Therapy as outlined above. Patient's symptoms are gastrointestinal in nature and no syncope was reported upon questioning the patient. An outpatient ischemic workup can be undertaken with pharmacologic stress testing. Patient should be treated for his GI reflux and treated with PPI therapy. An MCOT can be obtained.  if the patient developed syncope with administration of SL NTG, then this should clearly be avoided. He likely did not need the NTG.
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

## 2024-03-18 NOTE — PROGRESS NOTE ADULT - SUBJECTIVE AND OBJECTIVE BOX
73yMale  Being followed for excessive belching and abdominal pain  Interval history: Patient denies nausea, vomiting, hematemesis, melena, blood in stool, diarrhea, constipation. Patient with epigastric pain and excessive belching.      PAST MEDICAL & SURGICAL HISTORY:   Hypertension      High cholesterol                Social History: No smoking. No alcohol. No illegal drug use.            MEDICATIONS  (STANDING):  amLODIPine   Tablet 2.5 milliGRAM(s) Oral every 24 hours  aspirin enteric coated 81 milliGRAM(s) Oral daily  atorvastatin 20 milliGRAM(s) Oral at bedtime  cyanocobalamin 1000 MICROGram(s) Oral daily  dextrose 5%. 1000 milliLiter(s) (50 mL/Hr) IV Continuous <Continuous>  dextrose 5%. 1000 milliLiter(s) (100 mL/Hr) IV Continuous <Continuous>  dextrose 50% Injectable 12.5 Gram(s) IV Push once  dextrose 50% Injectable 25 Gram(s) IV Push once  dextrose 50% Injectable 25 Gram(s) IV Push once  enoxaparin Injectable 40 milliGRAM(s) SubCutaneous every 24 hours  famotidine    Tablet 20 milliGRAM(s) Oral daily  gabapentin 100 milliGRAM(s) Oral two times a day  glucagon  Injectable 1 milliGRAM(s) IntraMuscular once  insulin lispro (ADMELOG) corrective regimen sliding scale   SubCutaneous three times a day before meals  lidocaine   4% Patch 1 Patch Transdermal every 24 hours  loratadine 10 milliGRAM(s) Oral daily  pantoprazole    Tablet 40 milliGRAM(s) Oral two times a day  polyethylene glycol 3350 17 Gram(s) Oral every 12 hours  senna 2 Tablet(s) Oral at bedtime  tamsulosin 0.4 milliGRAM(s) Oral at bedtime    MEDICATIONS  (PRN):  acetaminophen     Tablet .. 650 milliGRAM(s) Oral every 6 hours PRN Temp greater or equal to 38C (100.4F), Mild Pain (1 - 3)  dextrose Oral Gel 15 Gram(s) Oral once PRN Blood Glucose LESS THAN 70 milliGRAM(s)/deciliter  melatonin 3 milliGRAM(s) Oral at bedtime PRN Insomnia  ondansetron Injectable 4 milliGRAM(s) IV Push every 8 hours PRN Nausea and/or Vomiting      Allergies:  fish (Urticaria)  No Known Drug Allergies  Intolerances          REVIEW OF SYSTEMS:  General:  No weight loss, fevers, or chills.  Eyes:  No reported pain or visual changes  ENT:  No sore throat or runny nose.  NECK: No stiffness   CV:  No chest pain or palpitations.  Resp:  No shortness of breath, cough  GI:  +epigastric abdominal pain, no nausea, vomiting, dysphagia, diarrhea or constipation. No rectal bleeding, melena, or hematemesis.  Neuro:  No tingling, numbness         VITAL SIGNS:   T(F): 98.1 (03-18-24 @ 05:00), Max: 98.1 (03-18-24 @ 05:00)  HR: 68 (03-18-24 @ 05:00) (62 - 73)  BP: 119/80 (03-18-24 @ 05:00) (113/77 - 121/76)  RR: 18 (03-18-24 @ 05:00) (16 - 18)  SpO2: 95% (03-18-24 @ 05:00) (95% - 97%)    PHYSICAL EXAM:  GENERAL: AAOx3, no acute distress.  HEAD:  Atraumatic, Normocephalic  EYES: conjunctiva and sclera clear  NECK: Supple, no JVD or thyromegaly  CHEST/LUNG: Clear to auscultation bilaterally; No wheeze, rhonchi, or rales  HEART: Regular rate and rhythm; normal S1, S2, No murmurs.  ABDOMEN: Soft, nontender, nondistended; Bowel sounds present  NEUROLOGY: No asterixis or tremor.   SKIN: Intact, no jaundice            LABS:                        12.8   4.13  )-----------( 156      ( 18 Mar 2024 08:16 )             38.0     03-18    136  |  101  |  10  ----------------------------<  94  4.2   |  29  |  0.6<L>    Ca    8.6      18 Mar 2024 08:16  Mg     2.7     03-18    TPro  6.4  /  Alb  4.0  /  TBili  0.8  /  DBili  x   /  AST  19  /  ALT  20  /  AlkPhos  90  03-18    LIVER FUNCTIONS - ( 18 Mar 2024 08:16 )  Alb: 4.0 g/dL / Pro: 6.4 g/dL / ALK PHOS: 90 U/L / ALT: 20 U/L / AST: 19 U/L / GGT: x               IMAGING:    < from: CT Angio Abdomen and Pelvis w/ IV Cont (03.16.24 @ 04:53) >    ACC: [id number] EXAM:  CT ANGIO ABD PELV (W)AW IC   ORDERED BY: JUAN ALBERTO GOLDSMITH     ACC: [id number] EXAM:  CT ANGIO CHEST AORTA WAWIC   ORDERED BY: JUAN ALBERTO GOLDSMITH     PROCEDURE DATE:  03/16/2024          INTERPRETATION:  CLINICAL HISTORY/REASON FOR EXAM: Chest pain. Clinical   concern for aortic dissection.    TECHNIQUE: Initial contiguous axial CT images of the chest without IV   contrast obtained. Post-administration of 95cc Omnipaque 350 intravenous   contrast, CT angiographic axial images of the chest, abdomen and pelvis   were obtained. Coronal and sagittal reformats were performed. 3D (MIP)   reformats obtained.    COMPARISON: None available    FINDINGS:    VASCULATURE: No acute intracranial hematoma. No acute aortic dissection   or penetrating atheromatous ulcer. Abdominal aortic aneurysm with   eccentric plaque measuring up to 3.3 cm. Aneurysmal dilation of the right   common iliac artery measuring up to 1.6 cm with eccentric plaque causing   greater than 50% of luminal narrowing without flow limiting stenosis.   Additional multiple areas of scattered calcified and soft plaque. Focal   narrowing of the celiac artery just distal to the ostium with mild   poststenotic dilatation without flow-limiting stenosis.    LUNGS, PLEURA, AND AIRWAYS: Respiratory motion limits evaluation. No   pneumothorax. No consolidation or pleural effusion. Central airways   patent. Left basilar subsegmental atelectasis.    MEDIASTINUM/THORACIC NODES: No mediastinal or axillary lymphadenopathy.    HEART/GREAT VESSELS: Minimal coronary artery calcifications.      HEPATOBILIARY: Small arterially enhancing focus at the hepatic dome   (series 304 image 90), likely flash filling hemangioma.    SPLEEN: Unremarkable.    PANCREAS: Within normal limits.    ADRENAL GLANDS: Unremarkable.    KIDNEYS: Symmetrically enhancing. No hydronephrosis. Bilateral renal   cysts and subcentimeter hypodensities too small to characterize    ABDOMINOPELVIC NODES: No lymphadenopathy.    PELVIC ORGANS: Markedly enlarged prostate gland. Small calcifications   anterior to the bladder. Tiny anterior bladder wall diverticulum.    VISUALIZED PERITONEUM /MESENTERY/BOWEL/PELVIS: No bowel obstruction,   pneumoperitoneum, or ascites.    BONES/SOFT TISSUES:Degenerative changes of the spine. Partially   visualized spinal hardware at the cervicothoracic junction. Flowing   anterior syndesmophytes within the thoracic spine.      IMPRESSION:  1.  No evidence of acute aortic syndromes specifically no evidence of   acute aortic dissection.  2.  No evidence of acute thoracic or abdominal pelvic pathology.  3.  Abdominal aortic aneurysm measuring up to 3.3 cm with eccentric   plaque.  4.  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  5.  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      --- End of Report ---          SALMA DARNELL MD; Resident Radiologist  This document has been electronically signed.  CIRA ARGUETA MD; Attending Radiologist  This document has been electronically signed. Mar 16 2024  7:52AM    < end of copied text >

## 2024-03-18 NOTE — CONSULT NOTE ADULT - SUBJECTIVE AND OBJECTIVE BOX
CECILIA PRADHAN [id number]  73y Male  2d    HPI:  73-year-old Chinese  speaking male accompanied by his daughter at bed side c/o chest pain and epigastric pain with bloating feeling.,Pt with  history of DM, HTN, presents with severe chest pain for past 4 hours.  The onset was sudden . There were no precipitating factor . The was described as 9/10 mid sternal chest pain.   .  Family will bring home medication list this morning . No information and  pending review. (16 Mar 2024 06:34)      PAST MEDICAL & SURGICAL HISTORY:  Hypertension      High cholesterol            MEDICATIONS  (STANDING):  aluminum hydroxide/magnesium hydroxide/simethicone Suspension 30 milliLiter(s) Oral every 6 hours  amLODIPine   Tablet 2.5 milliGRAM(s) Oral every 24 hours  aspirin enteric coated 81 milliGRAM(s) Oral daily  atorvastatin 20 milliGRAM(s) Oral at bedtime  cyanocobalamin 1000 MICROGram(s) Oral daily  dextrose 5%. 1000 milliLiter(s) (50 mL/Hr) IV Continuous <Continuous>  dextrose 5%. 1000 milliLiter(s) (100 mL/Hr) IV Continuous <Continuous>  dextrose 50% Injectable 12.5 Gram(s) IV Push once  dextrose 50% Injectable 25 Gram(s) IV Push once  dextrose 50% Injectable 25 Gram(s) IV Push once  enoxaparin Injectable 40 milliGRAM(s) SubCutaneous every 24 hours  famotidine    Tablet 20 milliGRAM(s) Oral daily  gabapentin 100 milliGRAM(s) Oral two times a day  glucagon  Injectable 1 milliGRAM(s) IntraMuscular once  insulin lispro (ADMELOG) corrective regimen sliding scale   SubCutaneous three times a day before meals  lidocaine   4% Patch 1 Patch Transdermal every 24 hours  loratadine 10 milliGRAM(s) Oral daily  pantoprazole    Tablet 40 milliGRAM(s) Oral two times a day  polyethylene glycol 3350 17 Gram(s) Oral every 12 hours  senna 2 Tablet(s) Oral at bedtime  tamsulosin 0.4 milliGRAM(s) Oral at bedtime    MEDICATIONS  (PRN):  acetaminophen     Tablet .. 650 milliGRAM(s) Oral every 6 hours PRN Temp greater or equal to 38C (100.4F), Mild Pain (1 - 3)  dextrose Oral Gel 15 Gram(s) Oral once PRN Blood Glucose LESS THAN 70 milliGRAM(s)/deciliter  melatonin 3 milliGRAM(s) Oral at bedtime PRN Insomnia  ondansetron Injectable 4 milliGRAM(s) IV Push every 8 hours PRN Nausea and/or Vomiting      Allergies    fish (Urticaria)  No Known Drug Allergies    Intolerances        REVIEW OF SYSTEMS    [ ] A ten-point review of systems was otherwise negative except as noted.  [ ] Due to altered mental status/intubation, subjective information were not able to be obtained from the patient. History was obtained, to the extent possible, from review of the chart and collateral sources of information.      Vital Signs Last 24 Hrs  T(C): 36.8 (18 Mar 2024 13:59), Max: 36.8 (18 Mar 2024 13:59)  T(F): 98.2 (18 Mar 2024 13:59), Max: 98.2 (18 Mar 2024 13:59)  HR: 75 (18 Mar 2024 13:59) (68 - 75)  BP: 162/89 (18 Mar 2024 13:59) (113/77 - 162/89)  BP(mean): --  RR: 18 (18 Mar 2024 13:59) (18 - 18)  SpO2: 97% (18 Mar 2024 13:59) (95% - 97%)    Parameters below as of 18 Mar 2024 05:00  Patient On (Oxygen Delivery Method): room air        PHYSICAL EXAM:  GENERAL: NAD, well-appearing  CHEST/LUNG: Clear to auscultation bilaterally  HEART: Regular rate and rhythm  ABDOMEN: Soft, Nontender, Nondistended;   EXTREMITIES:  No clubbing, cyanosis, or edema      LABS:  Labs:  CAPILLARY BLOOD GLUCOSE      POCT Blood Glucose.: 152 mg/dL (18 Mar 2024 11:56)  POCT Blood Glucose.: 88 mg/dL (18 Mar 2024 08:14)  POCT Blood Glucose.: 102 mg/dL (17 Mar 2024 16:45)                          12.8   4.13  )-----------( 156      ( 18 Mar 2024 08:16 )             38.0       Auto Neutrophil %: 73.2 % (03-18-24 @ 08:16)  Auto Immature Granulocyte %: 0.2 % (03-18-24 @ 08:16)    03-18    136  |  101  |  10  ----------------------------<  94  4.2   |  29  |  0.6<L>      Calcium: 8.6 mg/dL (03-18-24 @ 08:16)      LFTs:             6.4  | 0.8  | 19       ------------------[90      ( 18 Mar 2024 08:16 )  4.0  | x    | 20          Lipase:x      Amylase:x         Blood Gas Venous - Lactate: 1.4 mmol/L (03-16-24 @ 03:38)      Coags:            Urinalysis Basic - ( 18 Mar 2024 08:16 )    Color: x / Appearance: x / SG: x / pH: x  Gluc: 94 mg/dL / Ketone: x  / Bili: x / Urobili: x   Blood: x / Protein: x / Nitrite: x   Leuk Esterase: x / RBC: x / WBC x   Sq Epi: x / Non Sq Epi: x / Bacteria: x    RADIOLOGY & ADDITIONAL STUDIES:   CT Angio Abdomen and Pelvis w/ IV Cont (03.16.24 @ 04:53) >  IMPRESSION:  1.  No evidence of acute aortic syndromes specifically no evidence of   acute aortic dissection.  2.  No evidence of acute thoracic or abdominal pelvic pathology.  3.  Abdominal aortic aneurysm measuring up to 3.3 cm with eccentric   plaque.  4.  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  5.  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       CECILIA PRADHAN [id number]  73y Male  2d    HPI:  73-year-old Chinese  speaking male accompanied by his daughter at bed side c/o chest pain and epigastric pain with bloating feeling.,Pt with  history of DM, HTN, presents with severe chest pain for past 4 hours.  The onset was sudden . There were no precipitating factor . The was described as 9/10 mid sternal chest pain.   .  Family will bring home medication list this morning . No information and  pending review. (16 Mar 2024 06:34)    VASCULAR: AS ABOVE.      PAST MEDICAL & SURGICAL HISTORY:  Hypertension  High cholesterol      MEDICATIONS  (STANDING):  aluminum hydroxide/magnesium hydroxide/simethicone Suspension 30 milliLiter(s) Oral every 6 hours  amLODIPine   Tablet 2.5 milliGRAM(s) Oral every 24 hours  aspirin enteric coated 81 milliGRAM(s) Oral daily  atorvastatin 20 milliGRAM(s) Oral at bedtime  cyanocobalamin 1000 MICROGram(s) Oral daily  dextrose 5%. 1000 milliLiter(s) (50 mL/Hr) IV Continuous <Continuous>  dextrose 5%. 1000 milliLiter(s) (100 mL/Hr) IV Continuous <Continuous>  dextrose 50% Injectable 12.5 Gram(s) IV Push once  dextrose 50% Injectable 25 Gram(s) IV Push once  dextrose 50% Injectable 25 Gram(s) IV Push once  enoxaparin Injectable 40 milliGRAM(s) SubCutaneous every 24 hours  famotidine    Tablet 20 milliGRAM(s) Oral daily  gabapentin 100 milliGRAM(s) Oral two times a day  glucagon  Injectable 1 milliGRAM(s) IntraMuscular once  insulin lispro (ADMELOG) corrective regimen sliding scale   SubCutaneous three times a day before meals  lidocaine   4% Patch 1 Patch Transdermal every 24 hours  loratadine 10 milliGRAM(s) Oral daily  pantoprazole    Tablet 40 milliGRAM(s) Oral two times a day  polyethylene glycol 3350 17 Gram(s) Oral every 12 hours  senna 2 Tablet(s) Oral at bedtime  tamsulosin 0.4 milliGRAM(s) Oral at bedtime    MEDICATIONS  (PRN):  acetaminophen     Tablet .. 650 milliGRAM(s) Oral every 6 hours PRN Temp greater or equal to 38C (100.4F), Mild Pain (1 - 3)  dextrose Oral Gel 15 Gram(s) Oral once PRN Blood Glucose LESS THAN 70 milliGRAM(s)/deciliter  melatonin 3 milliGRAM(s) Oral at bedtime PRN Insomnia  ondansetron Injectable 4 milliGRAM(s) IV Push every 8 hours PRN Nausea and/or Vomiting      Allergies    fish (Urticaria)  No Known Drug Allergies    Intolerances        REVIEW OF SYSTEMS    [ ] A ten-point review of systems was otherwise negative except as noted.  [ ] Due to altered mental status/intubation, subjective information were not able to be obtained from the patient. History was obtained, to the extent possible, from review of the chart and collateral sources of information.      Vital Signs Last 24 Hrs  T(C): 36.8 (18 Mar 2024 13:59), Max: 36.8 (18 Mar 2024 13:59)  T(F): 98.2 (18 Mar 2024 13:59), Max: 98.2 (18 Mar 2024 13:59)  HR: 75 (18 Mar 2024 13:59) (68 - 75)  BP: 162/89 (18 Mar 2024 13:59) (113/77 - 162/89)  BP(mean): --  RR: 18 (18 Mar 2024 13:59) (18 - 18)  SpO2: 97% (18 Mar 2024 13:59) (95% - 97%)    Parameters below as of 18 Mar 2024 05:00  Patient On (Oxygen Delivery Method): room air        PHYSICAL EXAM:  GENERAL: NAD, well-appearing  CHEST/LUNG: Clear to auscultation bilaterally  HEART: Regular rate and rhythm  ABDOMEN: Soft, Nontender, Nondistended;   EXTREMITIES:  No clubbing, cyanosis, or edema      LABS:  Labs:  CAPILLARY BLOOD GLUCOSE      POCT Blood Glucose.: 152 mg/dL (18 Mar 2024 11:56)  POCT Blood Glucose.: 88 mg/dL (18 Mar 2024 08:14)  POCT Blood Glucose.: 102 mg/dL (17 Mar 2024 16:45)                          12.8   4.13  )-----------( 156      ( 18 Mar 2024 08:16 )             38.0       Auto Neutrophil %: 73.2 % (03-18-24 @ 08:16)  Auto Immature Granulocyte %: 0.2 % (03-18-24 @ 08:16)    03-18    136  |  101  |  10  ----------------------------<  94  4.2   |  29  |  0.6<L>      Calcium: 8.6 mg/dL (03-18-24 @ 08:16)      LFTs:             6.4  | 0.8  | 19       ------------------[90      ( 18 Mar 2024 08:16 )  4.0  | x    | 20          Lipase:x      Amylase:x         Blood Gas Venous - Lactate: 1.4 mmol/L (03-16-24 @ 03:38)      Coags:            Urinalysis Basic - ( 18 Mar 2024 08:16 )    Color: x / Appearance: x / SG: x / pH: x  Gluc: 94 mg/dL / Ketone: x  / Bili: x / Urobili: x   Blood: x / Protein: x / Nitrite: x   Leuk Esterase: x / RBC: x / WBC x   Sq Epi: x / Non Sq Epi: x / Bacteria: x    RADIOLOGY & ADDITIONAL STUDIES:   CT Angio Abdomen and Pelvis w/ IV Cont (03.16.24 @ 04:53) >  IMPRESSION:  1.  No evidence of acute aortic syndromes specifically no evidence of   acute aortic dissection.  2.  No evidence of acute thoracic or abdominal pelvic pathology.  3.  Abdominal aortic aneurysm measuring up to 3.3 cm with eccentric   plaque.  4.  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  5.  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       CECILIA PRADHAN [id number]  73y Male  2d    HPI:  73-year-old Chinese  speaking male accompanied by his daughter at bed side c/o chest pain and epigastric pain with bloating feeling.,Pt with  history of HLD, HTN, HX CERVICAL FRACTURE, S/P REPAIR - presents with severe chest pain for past 4 hours.  The onset was sudden . There were no precipitating factor . The was described as 9/10 mid sternal chest pain.   .  Family will bring home medication list this morning . No information and  pending review. (16 Mar 2024 06:34)    VASCULAR: DAUGHTER PROVIDING TRANSLATION/ADD'L INFO:  PT HAS HX OF FALL MANY YEARS AGO, HAD NECK SURGERY, AND SINCE THEN HAS C/O A FOCAL PACTCH OF NUMBNESS TO RIGHT LATERAL CALF - HE DENIES CALF PAIN  UPON WALKING, OR  HX LOWER LEG/FOOT ULCERS.  NEVER SMOKED      PAST MEDICAL & SURGICAL HISTORY:  Hypertension  High cholesterol  NECK SURGERY      MEDICATIONS  (STANDING):  aluminum hydroxide/magnesium hydroxide/simethicone Suspension 30 milliLiter(s) Oral every 6 hours  amLODIPine   Tablet 2.5 milliGRAM(s) Oral every 24 hours  aspirin enteric coated 81 milliGRAM(s) Oral daily  atorvastatin 20 milliGRAM(s) Oral at bedtime  cyanocobalamin 1000 MICROGram(s) Oral daily  dextrose 5%. 1000 milliLiter(s) (50 mL/Hr) IV Continuous <Continuous>  dextrose 5%. 1000 milliLiter(s) (100 mL/Hr) IV Continuous <Continuous>  dextrose 50% Injectable 12.5 Gram(s) IV Push once  dextrose 50% Injectable 25 Gram(s) IV Push once  dextrose 50% Injectable 25 Gram(s) IV Push once  enoxaparin Injectable 40 milliGRAM(s) SubCutaneous every 24 hours  famotidine    Tablet 20 milliGRAM(s) Oral daily  gabapentin 100 milliGRAM(s) Oral two times a day  glucagon  Injectable 1 milliGRAM(s) IntraMuscular once  insulin lispro (ADMELOG) corrective regimen sliding scale   SubCutaneous three times a day before meals  lidocaine   4% Patch 1 Patch Transdermal every 24 hours  loratadine 10 milliGRAM(s) Oral daily  pantoprazole    Tablet 40 milliGRAM(s) Oral two times a day  polyethylene glycol 3350 17 Gram(s) Oral every 12 hours  senna 2 Tablet(s) Oral at bedtime  tamsulosin 0.4 milliGRAM(s) Oral at bedtime    MEDICATIONS  (PRN):  acetaminophen     Tablet .. 650 milliGRAM(s) Oral every 6 hours PRN Temp greater or equal to 38C (100.4F), Mild Pain (1 - 3)  dextrose Oral Gel 15 Gram(s) Oral once PRN Blood Glucose LESS THAN 70 milliGRAM(s)/deciliter  melatonin 3 milliGRAM(s) Oral at bedtime PRN Insomnia  ondansetron Injectable 4 milliGRAM(s) IV Push every 8 hours PRN Nausea and/or Vomiting      Allergies    fish (Urticaria)  No Known Drug Allergies    Intolerances        REVIEW OF SYSTEMS    [ ] A ten-point review of systems was otherwise negative except as noted.  [ ] Due to altered mental status/intubation, subjective information were not able to be obtained from the patient. History was obtained, to the extent possible, from review of the chart and collateral sources of information.      Vital Signs Last 24 Hrs  T(C): 36.8 (18 Mar 2024 13:59), Max: 36.8 (18 Mar 2024 13:59)  T(F): 98.2 (18 Mar 2024 13:59), Max: 98.2 (18 Mar 2024 13:59)  HR: 75 (18 Mar 2024 13:59) (68 - 75)  BP: 162/89 (18 Mar 2024 13:59) (113/77 - 162/89)  BP(mean): --  RR: 18 (18 Mar 2024 13:59) (18 - 18)  SpO2: 97% (18 Mar 2024 13:59) (95% - 97%)    Parameters below as of 18 Mar 2024 05:00  Patient On (Oxygen Delivery Method): room air        PHYSICAL EXAM:  GENERAL: NAD,  CHEST/LUNG: Clear to auscultation bilaterally  HEART: Regular rate and rhythm  ABDOMEN: Soft, Nontender, Nondistended;   EXTREMITIES:  No clubbing, cyanosis, ulcers or edema B/L LE; no calf tenderness B/L  Pulses:  2+ B/L DP      LABS:  Labs:  CAPILLARY BLOOD GLUCOSE      POCT Blood Glucose.: 152 mg/dL (18 Mar 2024 11:56)  POCT Blood Glucose.: 88 mg/dL (18 Mar 2024 08:14)  POCT Blood Glucose.: 102 mg/dL (17 Mar 2024 16:45)                          12.8   4.13  )-----------( 156      ( 18 Mar 2024 08:16 )             38.0       Auto Neutrophil %: 73.2 % (03-18-24 @ 08:16)  Auto Immature Granulocyte %: 0.2 % (03-18-24 @ 08:16)    03-18    136  |  101  |  10  ----------------------------<  94  4.2   |  29  |  0.6<L>      Calcium: 8.6 mg/dL (03-18-24 @ 08:16)      LFTs:             6.4  | 0.8  | 19       ------------------[90      ( 18 Mar 2024 08:16 )  4.0  | x    | 20          Lipase:x      Amylase:x         Blood Gas Venous - Lactate: 1.4 mmol/L (03-16-24 @ 03:38)      Coags:            Urinalysis Basic - ( 18 Mar 2024 08:16 )    Color: x / Appearance: x / SG: x / pH: x  Gluc: 94 mg/dL / Ketone: x  / Bili: x / Urobili: x   Blood: x / Protein: x / Nitrite: x   Leuk Esterase: x / RBC: x / WBC x   Sq Epi: x / Non Sq Epi: x / Bacteria: x    RADIOLOGY & ADDITIONAL STUDIES:   CT Angio Abdomen and Pelvis w/ IV Cont (03.16.24 @ 04:53) >  IMPRESSION:  1.  No evidence of acute aortic syndromes specifically no evidence of   acute aortic dissection.  2.  No evidence of acute thoracic or abdominal pelvic pathology.  3.  Abdominal aortic aneurysm measuring up to 3.3 cm with eccentric   plaque.  4.  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  5.  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

## 2024-03-18 NOTE — PROGRESS NOTE ADULT - ASSESSMENT
73-year-old Chinese speaking male accompanied by his daughter at bed side c/o chest pain and epigastric pain with bloating feeling.,Pt with  history of DM, HTN, presents with severe chest pain for past 4 hours.  The onset was sudden . There were no precipitating factor . The was described as 9/10 mid sternal chest pain    Assessment    ·	Atypical chest pain likely secondary to GERD vs. 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 past and s/p cervical spine surgery  ·	Constipation  ·	HTN  ·	DM  ·	BPH  ·	Seasonal allergies    Plan    - c/w protonix 40 po bid for now as he states it helps him at home / complaining of food sometimes getting stuck and staying in his midchest / c/w famotidine here, will give standing maalox for gas, family requesting EGD to be done here if possible  - will add bisacodyl suppository, c/w miralax and senna / moderate stool burden on xray  - discussed syncope with cardio, will keep on tele for now and may need outpatient MCOT, orthostatics negative, f/u echo, had 5 beats NSVT   - gabapentin 100 bid (started as outpatient recently)  - amlodipine 2.5 qd  - insulin if fs > 180  - tamsulosin  - loratadine    Pending: GI follow up, tele for syncope, f/u echo, BM    # DVT PPX: lovenox 73-year-old Chinese speaking male accompanied by his daughter at bed side c/o chest pain and epigastric pain with bloating feeling.,Pt with  history of DM, HTN, presents with severe chest pain for past 4 hours.  The onset was sudden . There were no precipitating factor . The was described as 9/10 mid sternal chest pain    Assessment    ·	Atypical chest pain likely secondary to GERD vs. 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 past and s/p cervical spine surgery  ·	Constipation  ·	HTN  ·	DM  ·	BPH  ·	Seasonal allergies    Plan    - c/w protonix 40 po bid for now as he states it helps him at home / complaining of food sometimes getting stuck and staying in his midchest / c/w famotidine here, will give standing maalox for gas, family requesting EGD to be done here if possible  - c/w miralax and senna / had large bowel movement  - discussed syncope with cardio, will keep on tele for now and may need outpatient MCOT, orthostatics negative, f/u echo, had 5 beats NSVT   - gabapentin 100 bid (started as outpatient recently)  - amlodipine 2.5 qd  - insulin if fs > 180  - tamsulosin  - loratadine    Pending: GI follow up, tele for syncope, f/u echo, BM    # DVT PPX: lovenox 73-year-old Chinese speaking male accompanied by his daughter at bed side c/o chest pain and epigastric pain with bloating feeling.,Pt with  history of DM, HTN, presents with severe chest pain for past 4 hours.  The onset was sudden . There were no precipitating factor . The was described as 9/10 mid sternal chest pain    Assessment    ·	Atypical chest pain likely secondary to GERD vs. 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 past and s/p cervical spine surgery  ·	Left iliac artery stenosis with plaque as well as abd aortic aneurysm  ·	Constipation  ·	HTN  ·	DM  ·	BPH  ·	Seasonal allergies    Plan    - c/w protonix 40 po bid for now as he states it helps him at home / complaining of food sometimes getting stuck and staying in his midchest / c/w famotidine here, will give standing maalox for gas, family requesting EGD to be done here if possible / dw GI, may be able to schedule direct EGD/colo as an outpatient  - c/w miralax and senna / had large bowel movement  - discussed syncope with cardio, outpatient MCOT, orthostatics negative, echo EF 66%, had 5 beats NSVT // d/w cardio will dc tele, no more NSVT has PACs  - gabapentin 100 bid (started as outpatient recently)  - vascular follow up for iliac artery stenosis, also requested by GI prior to scopes  - amlodipine 2.5 qd  - insulin if fs > 180  - tamsulosin  - loratadine    Pending: GI follow up, vascular follow up    # DVT PPX: lovenox

## 2024-03-19 ENCOUNTER — TRANSCRIPTION ENCOUNTER (OUTPATIENT)
Age: 74
End: 2024-03-19

## 2024-03-19 VITALS
SYSTOLIC BLOOD PRESSURE: 111 MMHG | DIASTOLIC BLOOD PRESSURE: 74 MMHG | HEART RATE: 72 BPM | TEMPERATURE: 98 F | RESPIRATION RATE: 20 BRPM | OXYGEN SATURATION: 96 %

## 2024-03-19 LAB
GLUCOSE BLDC GLUCOMTR-MCNC: 110 MG/DL — HIGH (ref 70–99)
GLUCOSE BLDC GLUCOMTR-MCNC: 111 MG/DL — HIGH (ref 70–99)
GLUCOSE BLDC GLUCOMTR-MCNC: 90 MG/DL — SIGNIFICANT CHANGE UP (ref 70–99)

## 2024-03-19 PROCEDURE — 99233 SBSQ HOSP IP/OBS HIGH 50: CPT

## 2024-03-19 PROCEDURE — 99238 HOSP IP/OBS DSCHRG MGMT 30/<: CPT

## 2024-03-19 PROCEDURE — 99221 1ST HOSP IP/OBS SF/LOW 40: CPT

## 2024-03-19 RX ORDER — SENNA PLUS 8.6 MG/1
2 TABLET ORAL
Qty: 0 | Refills: 0 | DISCHARGE
Start: 2024-03-19

## 2024-03-19 RX ORDER — SIMETHICONE 80 MG/1
1 TABLET, CHEWABLE ORAL
Refills: 0 | DISCHARGE

## 2024-03-19 RX ORDER — POLYETHYLENE GLYCOL 3350 17 G/17G
17 POWDER, FOR SOLUTION ORAL
Qty: 0 | Refills: 0 | DISCHARGE
Start: 2024-03-19

## 2024-03-19 RX ORDER — PANTOPRAZOLE SODIUM 20 MG/1
1 TABLET, DELAYED RELEASE ORAL
Qty: 0 | Refills: 0 | DISCHARGE
Start: 2024-03-19

## 2024-03-19 RX ORDER — PANTOPRAZOLE SODIUM 20 MG/1
1 TABLET, DELAYED RELEASE ORAL
Refills: 0 | DISCHARGE

## 2024-03-19 RX ORDER — IBUPROFEN 200 MG
1 TABLET ORAL
Refills: 0 | DISCHARGE

## 2024-03-19 RX ORDER — GABAPENTIN 400 MG/1
1 CAPSULE ORAL
Qty: 0 | Refills: 0 | DISCHARGE
Start: 2024-03-19

## 2024-03-19 RX ADMIN — LIDOCAINE 1 PATCH: 4 CREAM TOPICAL at 07:49

## 2024-03-19 RX ADMIN — Medication 30 MILLILITER(S): at 11:45

## 2024-03-19 RX ADMIN — GABAPENTIN 100 MILLIGRAM(S): 400 CAPSULE ORAL at 05:43

## 2024-03-19 RX ADMIN — PANTOPRAZOLE SODIUM 40 MILLIGRAM(S): 20 TABLET, DELAYED RELEASE ORAL at 05:43

## 2024-03-19 RX ADMIN — LORATADINE 10 MILLIGRAM(S): 10 TABLET ORAL at 11:48

## 2024-03-19 RX ADMIN — FAMOTIDINE 20 MILLIGRAM(S): 10 INJECTION INTRAVENOUS at 11:47

## 2024-03-19 RX ADMIN — AMLODIPINE BESYLATE 2.5 MILLIGRAM(S): 2.5 TABLET ORAL at 11:46

## 2024-03-19 RX ADMIN — Medication 30 MILLILITER(S): at 05:43

## 2024-03-19 RX ADMIN — Medication 81 MILLIGRAM(S): at 11:47

## 2024-03-19 RX ADMIN — PREGABALIN 1000 MICROGRAM(S): 225 CAPSULE ORAL at 11:47

## 2024-03-19 RX ADMIN — LIDOCAINE 1 PATCH: 4 CREAM TOPICAL at 11:50

## 2024-03-19 NOTE — DISCHARGE NOTE NURSING/CASE MANAGEMENT/SOCIAL WORK - PATIENT PORTAL LINK FT
You can access the FollowMyHealth Patient Portal offered by [facility] by registering at the following website: [email or web address]. By joining [email or web address] FollowMyHealth portal, you will also be able to view your health information using other applications (apps) compatible with our system.

## 2024-03-19 NOTE — DISCHARGE NOTE PROVIDER - PROVIDER TOKENS
PROVIDER:[TOKEN:[[id number]],FOLLOWUP:[1-3 days]],PROVIDER:[TOKEN:[[id number]],FOLLOWUP:[1 week]]

## 2024-03-19 NOTE — PROGRESS NOTE ADULT - ASSESSMENT
73-year-old Chinese  speaking male accompanied by his daughter at bed side c/o chest pain and epigastric pain with bloating feeling.,Pt with  history of DM, HTN, presents with severe chest pain for past 4 hours.  The onset was sudden . There were no precipitating factor . The was described as 9/10 mid sternal chest pain. ruled out for ACS.    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 patient also has own appointment with GI 4/3/24  Diet as tolerated  PPI BID daily until EGD     #Abdominal aortic aneurysm measuring up to 3.3 cm with eccentric   plaque.  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  Rec  -vascular eval appreciated     #Constipation  Needs bowel regimen  Needs colonoscopy to be done out pt through GI MAP clinic as well.  Risks and benefits discussed     73-year-old Chinese  speaking male accompanied by his daughter at bed side c/o chest pain and epigastric pain with bloating feeling.,Pt with  history of DM, HTN, presents with severe chest pain for past 4 hours.  The onset was sudden . There were no precipitating factor . The was described as 9/10 mid sternal chest pain. ruled out for ACS.    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 patient also has own appointment with GI 4/3/24  Diet as tolerated  PPI BID daily until EGD     #Abdominal aortic aneurysm measuring up to 3.3 cm with eccentric   plaque.  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  Rec  -vascular eval appreciated     #Constipation  Needs bowel regimen  Needs colonoscopy to be done out pt reached out to office and MAP team to arrange  Risks and benefits discussed

## 2024-03-19 NOTE — PROGRESS NOTE ADULT - NS ATTEND AMEND GEN_ALL_CORE FT
Case seen discussed with KOBY HIGGINS at [facility].  Agree with above and modified where needed.
I edited the note

## 2024-03-19 NOTE — DISCHARGE NOTE PROVIDER - ATTENDING DISCHARGE PHYSICAL EXAMINATION:
General: WN/WD NAD  Neurology: A&Ox3, nonfocal, FERREIRA x 4  Head:  Normocephalic, atraumatic  ENT:  Mucosa moist, no ulcerations  Neck:  Supple, no sinuses or palpable masses  Lymphatic:  No palpable cervical, supraclavicular, axillary or inguinal adenopathy  Respiratory: CTA B/L  CV: RRR, S1S2, no murmur  Abdominal: Soft, NT, ND no palpable mass  MSK: No edema, + peripheral pulses, FROM all 4 extremity  Incisions: intact, no erythema or drainage

## 2024-03-19 NOTE — PROGRESS NOTE ADULT - SUBJECTIVE AND OBJECTIVE BOX
Patient seen with Dr. Scott, spoke with patient's daughter who translated  Patient denies current abdominal pain, nausea, vomiting  Patient and family express no current concerns    ICU Vital Signs Last 24 Hrs  T(C): 36.5 (19 Mar 2024 05:09), Max: 36.8 (18 Mar 2024 13:59)  T(F): 97.7 (19 Mar 2024 05:09), Max: 98.2 (18 Mar 2024 13:59)  HR: 68 (19 Mar 2024 05:09) (68 - 75)  BP: 115/77 (19 Mar 2024 05:09) (115/77 - 162/89)  BP(mean): --  ABP: --  ABP(mean): --  RR: 20 (19 Mar 2024 05:09) (18 - 20)  SpO2: 97% (19 Mar 2024 05:09) (97% - 97%)    O2 Parameters below as of 19 Mar 2024 05:09  Patient On (Oxygen Delivery Method): room air        MEDICATIONS  (STANDING):  aluminum hydroxide/magnesium hydroxide/simethicone Suspension 30 milliLiter(s) Oral every 6 hours  amLODIPine   Tablet 2.5 milliGRAM(s) Oral every 24 hours  aspirin enteric coated 81 milliGRAM(s) Oral daily  atorvastatin 20 milliGRAM(s) Oral at bedtime  cyanocobalamin 1000 MICROGram(s) Oral daily  dextrose 5%. 1000 milliLiter(s) (50 mL/Hr) IV Continuous <Continuous>  dextrose 5%. 1000 milliLiter(s) (100 mL/Hr) IV Continuous <Continuous>  dextrose 50% Injectable 12.5 Gram(s) IV Push once  dextrose 50% Injectable 25 Gram(s) IV Push once  dextrose 50% Injectable 25 Gram(s) IV Push once  enoxaparin Injectable 40 milliGRAM(s) SubCutaneous every 24 hours  famotidine    Tablet 20 milliGRAM(s) Oral daily  gabapentin 100 milliGRAM(s) Oral two times a day  glucagon  Injectable 1 milliGRAM(s) IntraMuscular once  insulin lispro (ADMELOG) corrective regimen sliding scale   SubCutaneous three times a day before meals  lidocaine   4% Patch 1 Patch Transdermal every 24 hours  loratadine 10 milliGRAM(s) Oral daily  pantoprazole    Tablet 40 milliGRAM(s) Oral two times a day  polyethylene glycol 3350 17 Gram(s) Oral every 12 hours  senna 2 Tablet(s) Oral at bedtime  tamsulosin 0.4 milliGRAM(s) Oral at bedtime    MEDICATIONS  (PRN):  acetaminophen     Tablet .. 650 milliGRAM(s) Oral every 6 hours PRN Temp greater or equal to 38C (100.4F), Mild Pain (1 - 3)  dextrose Oral Gel 15 Gram(s) Oral once PRN Blood Glucose LESS THAN 70 milliGRAM(s)/deciliter  melatonin 3 milliGRAM(s) Oral at bedtime PRN Insomnia  ondansetron Injectable 4 milliGRAM(s) IV Push every 8 hours PRN Nausea and/or Vomiting    PHYSICAL EXAM:  GENERAL: NAD, well-developed  HEAD:  Atraumatic, Normocephalic  ABDOMEN: Soft, Nontender, Nondistended    Labs:  CAPILLARY BLOOD GLUCOSE      POCT Blood Glucose.: 90 mg/dL (19 Mar 2024 07:47)  POCT Blood Glucose.: 94 mg/dL (18 Mar 2024 21:08)  POCT Blood Glucose.: 120 mg/dL (18 Mar 2024 16:26)  POCT Blood Glucose.: 152 mg/dL (18 Mar 2024 11:56)  POCT Blood Glucose.: 88 mg/dL (18 Mar 2024 08:14)                          12.8   4.13  )-----------( 156      ( 18 Mar 2024 08:16 )             38.0       Auto Neutrophil %: 73.2 % (03-18-24 @ 08:16)  Auto Immature Granulocyte %: 0.2 % (03-18-24 @ 08:16)    03-18    136  |  101  |  10  ----------------------------<  94  4.2   |  29  |  0.6<L>      Calcium: 8.6 mg/dL (03-18-24 @ 08:16)      LFTs:             6.4  | 0.8  | 19       ------------------[90      ( 18 Mar 2024 08:16 )  4.0  | x    | 20          Lipase:x      Amylase:x        Urinalysis Basic - ( 18 Mar 2024 08:16 )    Color: x / Appearance: x / SG: x / pH: x  Gluc: 94 mg/dL / Ketone: x  / Bili: x / Urobili: x   Blood: x / Protein: x / Nitrite: x   Leuk Esterase: x / RBC: x / WBC x   Sq Epi: x / Non Sq Epi: x / Bacteria: x

## 2024-03-19 NOTE — DISCHARGE NOTE PROVIDER - CARE PROVIDERS DIRECT ADDRESSES
[email or web address]

## 2024-03-19 NOTE — DISCHARGE NOTE PROVIDER - NSDCCPCAREPLAN_GEN_ALL_CORE_FT
PRINCIPAL DISCHARGE DIAGNOSIS  Diagnosis: Chest pain  Assessment and Plan of Treatment: You arrived here with some chest discomfort that was due to gas, you weres started on maalox which helped you.  Please continue the medications that are on this list as well as the scripts you have been given.  You were seen by cardiology and gastroenterology as well as the vascular team.  Please follow up with your cardiologist as an outpatient as well as vascular, contact info has been provided.  As we discussed the gastroenterology team will call you about your scope to see what's going on in your stomach.  They will schedule the procedure with you.  Please also follow up with your primary care provider.

## 2024-03-19 NOTE — DISCHARGE NOTE PROVIDER - NSDCFUADDAPPT_GEN_ALL_CORE_FT
APPTS ARE READY TO BE MADE: [ x] YES    Best Family or Patient Contact (if needed): daughter Daniella    Additional Information about above appointments (if needed):    1: was told that GI would call them for EGD scheduling  2: vascular  3: his cardiologist    Other comments or requests:

## 2024-03-19 NOTE — PROGRESS NOTE ADULT - SUBJECTIVE AND OBJECTIVE BOX
73yMale  Being followed for hiccups and epigastric pain  Interval history: patient refused  wanted daughter to translate. Patient denies nausea, vomiting, hematemesis, melena, blood in stool, diarrhea, constipation, abdominal pain. Patient feeling much better today.      PAST MEDICAL & SURGICAL HISTORY:   Hypertension      High cholesterol                Social History: No smoking. No alcohol. No illegal drug use.          MEDICATIONS  (STANDING):  aluminum hydroxide/magnesium hydroxide/simethicone Suspension 30 milliLiter(s) Oral every 6 hours  amLODIPine   Tablet 2.5 milliGRAM(s) Oral every 24 hours  aspirin enteric coated 81 milliGRAM(s) Oral daily  atorvastatin 20 milliGRAM(s) Oral at bedtime  cyanocobalamin 1000 MICROGram(s) Oral daily  dextrose 5%. 1000 milliLiter(s) (50 mL/Hr) IV Continuous <Continuous>  dextrose 5%. 1000 milliLiter(s) (100 mL/Hr) IV Continuous <Continuous>  dextrose 50% Injectable 12.5 Gram(s) IV Push once  dextrose 50% Injectable 25 Gram(s) IV Push once  dextrose 50% Injectable 25 Gram(s) IV Push once  enoxaparin Injectable 40 milliGRAM(s) SubCutaneous every 24 hours  famotidine    Tablet 20 milliGRAM(s) Oral daily  gabapentin 100 milliGRAM(s) Oral two times a day  glucagon  Injectable 1 milliGRAM(s) IntraMuscular once  insulin lispro (ADMELOG) corrective regimen sliding scale   SubCutaneous three times a day before meals  lidocaine   4% Patch 1 Patch Transdermal every 24 hours  loratadine 10 milliGRAM(s) Oral daily  pantoprazole    Tablet 40 milliGRAM(s) Oral two times a day  polyethylene glycol 3350 17 Gram(s) Oral every 12 hours  senna 2 Tablet(s) Oral at bedtime  tamsulosin 0.4 milliGRAM(s) Oral at bedtime    MEDICATIONS  (PRN):  acetaminophen     Tablet .. 650 milliGRAM(s) Oral every 6 hours PRN Temp greater or equal to 38C (100.4F), Mild Pain (1 - 3)  dextrose Oral Gel 15 Gram(s) Oral once PRN Blood Glucose LESS THAN 70 milliGRAM(s)/deciliter  melatonin 3 milliGRAM(s) Oral at bedtime PRN Insomnia  ondansetron Injectable 4 milliGRAM(s) IV Push every 8 hours PRN Nausea and/or Vomiting      Allergies:   fish (Urticaria)  No Known Drug Allergies  Intolerances          REVIEW OF SYSTEMS:  General:  No weight loss, fevers, or chills.  Eyes:  No reported pain or visual changes  ENT:  No sore throat or runny nose.  NECK: No stiffness   CV:  No chest pain or palpitations.  Resp:  No shortness of breath, cough  GI:  No abdominal pain, nausea, vomiting, dysphagia, diarrhea or constipation. No rectal bleeding, melena, or hematemesis.  Neuro:  No tingling, numbness       VITAL SIGNS:   T(F): 97.7 (03-19-24 @ 05:09), Max: 98.2 (03-18-24 @ 13:59)  HR: 68 (03-19-24 @ 05:09) (68 - 75)  BP: 115/77 (03-19-24 @ 05:09) (115/77 - 162/89)  RR: 20 (03-19-24 @ 05:09) (18 - 20)  SpO2: 98% (03-19-24 @ 09:51) (97% - 98%)    PHYSICAL EXAM:  GENERAL: AAOx3, no acute distress.  HEAD:  Atraumatic, Normocephalic  EYES: conjunctiva and sclera clear  NECK: Supple, no JVD or thyromegaly  CHEST/LUNG: Clear to auscultation bilaterally; No wheeze, rhonchi, or rales  HEART: Regular rate and rhythm; normal S1, S2, No murmurs.  ABDOMEN: Soft, nontender, nondistended; Bowel sounds present  NEUROLOGY: No asterixis or tremor.   SKIN: Intact, no jaundice            LABS:                        12.8   4.13  )-----------( 156      ( 18 Mar 2024 08:16 )             38.0     03-18    136  |  101  |  10  ----------------------------<  94  4.2   |  29  |  0.6<L>    Ca    8.6      18 Mar 2024 08:16  Mg     2.7     03-18    TPro  6.4  /  Alb  4.0  /  TBili  0.8  /  DBili  x   /  AST  19  /  ALT  20  /  AlkPhos  90  03-18    LIVER FUNCTIONS - ( 18 Mar 2024 08:16 )  Alb: 4.0 g/dL / Pro: 6.4 g/dL / ALK PHOS: 90 U/L / ALT: 20 U/L / AST: 19 U/L / GGT: x               IMAGING:    < from: CT Angio Abdomen and Pelvis w/ IV Cont (03.16.24 @ 04:53) >    ACC: [id number] EXAM:  CT ANGIO ABD PELV (W)AW IC   ORDERED BY: JUAN ALBERTO GOLDSMITH     ACC: [id number] EXAM:  CT ANGIO CHEST AORTA WAWIC   ORDERED BY: JUAN ALBERTO GOLDSMITH     PROCEDURE DATE:  03/16/2024          INTERPRETATION:  CLINICAL HISTORY/REASON FOR EXAM: Chest pain. Clinical   concern for aortic dissection.    TECHNIQUE: Initial contiguous axial CT images of the chest without IV   contrast obtained. Post-administration of 95cc Omnipaque 350 intravenous   contrast, CT angiographic axial images of the chest, abdomen and pelvis   were obtained. Coronal and sagittal reformats were performed. 3D (MIP)   reformats obtained.    COMPARISON: None available    FINDINGS:    VASCULATURE: No acute intracranial hematoma. No acute aortic dissection   or penetrating atheromatous ulcer. Abdominal aortic aneurysm with   eccentric plaque measuring up to 3.3 cm. Aneurysmal dilation of the right   common iliac artery measuring up to 1.6 cm with eccentric plaque causing   greater than 50% of luminal narrowing without flow limiting stenosis.   Additional multiple areas of scattered calcified and soft plaque. Focal   narrowing of the celiac artery just distal to the ostium with mild   poststenotic dilatation without flow-limiting stenosis.    LUNGS, PLEURA, AND AIRWAYS: Respiratory motion limits evaluation. No   pneumothorax. No consolidation or pleural effusion. Central airways   patent. Left basilar subsegmental atelectasis.    MEDIASTINUM/THORACIC NODES: No mediastinal or axillary lymphadenopathy.    HEART/GREAT VESSELS: Minimal coronary artery calcifications.      HEPATOBILIARY: Small arterially enhancing focus at the hepatic dome   (series 304 image 90), likely flash filling hemangioma.    SPLEEN: Unremarkable.    PANCREAS: Within normal limits.    ADRENAL GLANDS: Unremarkable.    KIDNEYS: Symmetrically enhancing. No hydronephrosis. Bilateral renal   cysts and subcentimeter hypodensities too small to characterize    ABDOMINOPELVIC NODES: No lymphadenopathy.    PELVIC ORGANS: Markedly enlarged prostate gland. Small calcifications   anterior to the bladder. Tiny anterior bladder wall diverticulum.    VISUALIZED PERITONEUM /MESENTERY/BOWEL/PELVIS: No bowel obstruction,   pneumoperitoneum, or ascites.    BONES/SOFT TISSUES:Degenerative changes of the spine. Partially   visualized spinal hardware at the cervicothoracic junction. Flowing   anterior syndesmophytes within the thoracic spine.      IMPRESSION:  1.  No evidence of acute aortic syndromes specifically no evidence of   acute aortic dissection.  2.  No evidence of acute thoracic or abdominal pelvic pathology.  3.  Abdominal aortic aneurysm measuring up to 3.3 cm with eccentric   plaque.  4.  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  5.  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      --- End of Report ---          SALMA DARNELL MD; Resident Radiologist  This document has been electronically signed.  CIRA ARGUETA MD; Attending Radiologist  This document has been electronically signed. Mar 16 2024  7:52AM    < end of copied text >

## 2024-03-19 NOTE — DISCHARGE NOTE PROVIDER - NSDCMRMEDTOKEN_GEN_ALL_CORE_FT
aluminum hydroxide-magnesium hydroxide 200 mg-200 mg/5 mL oral suspension: 30 milliliter(s) orally every 6 hours as needed for  indigestion  amLODIPine 2.5 mg oral tablet: 1 tab(s) orally once a day  aspirin 81 mg oral delayed release tablet: 1 tab(s) orally once a day  atorvastatin 20 mg oral tablet: 1 tab(s) orally once a day  famotidine 40 mg oral tablet: 1 tab(s) orally once a day  gabapentin 100 mg oral capsule: 1 cap(s) orally 2 times a day  hydrOXYzine pamoate 25 mg oral capsule: 1 cap(s) orally 3 times a day as needed for  allergy symptoms  loratadine 10 mg oral tablet: 1 tab(s) orally once a day  methylcobalamin 1000 mcg oral tablet, chewable: 1 tab(s) chewed once a day  pantoprazole 40 mg oral delayed release tablet: 1 tab(s) orally 2 times a day  polyethylene glycol 3350 oral powder for reconstitution: 17 gram(s) orally once a day as needed for  constipation  senna leaf extract oral tablet: 2 tab(s) orally once a day (at bedtime)  tamsulosin 0.4 mg oral capsule: 1 cap(s) orally once a day

## 2024-03-19 NOTE — DISCHARGE NOTE PROVIDER - HOSPITAL COURSE
73-year-old Chinese speaking male accompanied by his daughter at bed side c/o chest pain and epigastric pain with bloating feeling.,Pt with  history of DM, HTN, presents with severe chest pain for past 4 hours.  The onset was sudden . There were no precipitating factor . The was described as 9/10 mid sternal chest pain    Assessment    Atypical chest pain likely secondary to GERD vs. 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 and s/p cervical spine surgery  Left iliac artery stenosis with plaque as well as abd aortic aneurysm  Constipation  HTN  DM  BPH  Seasonal allergies    Plan    - c/w protonix 40 po bid for now as he states it helps him at home  / c/w famotidine here, will give standing maalox for gas, d/w GI and will call patient for procedure as outpatient  - c/w miralax and senna / had large bowel movement  - discussed syncope with cardio, outpatient MCOT, orthostatics negative, echo EF 66%, had 5 beats NSVT // d/w cardio will dc tele, no more NSVT has PACs  - gabapentin 100 bid (started as outpatient recently)  - vascular follow up for iliac artery stenosis, also requested by GI prior to scopes  - amlodipine 2.5 qd  - insulin if fs > 180  - tamsulosin  - loratadine    discharge 73-year-old Chinese speaking male accompanied by his daughter at bed side c/o chest pain and epigastric pain with bloating feeling.,Pt with  history of DM, HTN, presents with severe chest pain for past 4 hours.  The onset was sudden . There were no precipitating factor . The was described as 9/10 mid sternal chest pain    Assessment    Atypical chest pain likely secondary to GERD vs. 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 and s/p cervical spine surgery  Left iliac artery stenosis with plaque as well as abd aortic aneurysm  Constipation  HTN  DM  BPH  Seasonal allergies    Plan    - c/w protonix 40 po bid for now as he states it helps him at home  / c/w famotidine here, will give standing maalox for gas, d/w GI and will call patient for procedure as outpatient  - c/w miralax and senna / had large bowel movement  - discussed syncope with cardio, outpatient MCOT, orthostatics negative, echo EF 66%, had 5 beats NSVT // d/w cardio will dc tele, no more NSVT has PACs  - gabapentin 100 bid (started as outpatient recently)  - vascular followed up for iliac artery stenosis, also requested by GI prior to scopes, seen by vascular and cleared for outpatient follow up  - amlodipine 2.5 qd  - insulin if fs > 180  - tamsulosin  - loratadine    discharge

## 2024-03-19 NOTE — DISCHARGE NOTE PROVIDER - CARE PROVIDER_API CALL
Nabor Naranjo  Gastroenterology  [address] [facility] [place] [address]  Phone: [phone]  Fax: [phone]  Follow Up Time: 1-3 days    Wil Scott  Vascular Surgery  [address] [place] [address], [address]  [place] [address]  Phone: [phone]  Fax: [phone]  Follow Up Time: 1 week

## 2024-03-20 NOTE — CHART NOTE - NSCHARTNOTEFT_GEN_A_CORE
Patient declined assistance for gastro, appointment we are able to offer is too far out, did not need assistance with vascular either.

## 2024-03-27 DIAGNOSIS — E11.40 TYPE 2 DIABETES MELLITUS WITH DIABETIC NEUROPATHY, UNSPECIFIED: ICD-10-CM

## 2024-03-27 DIAGNOSIS — E11.65 TYPE 2 DIABETES MELLITUS WITH HYPERGLYCEMIA: ICD-10-CM

## 2024-03-27 DIAGNOSIS — E78.5 HYPERLIPIDEMIA, UNSPECIFIED: ICD-10-CM

## 2024-03-27 DIAGNOSIS — I72.3 ANEURYSM OF ILIAC ARTERY: ICD-10-CM

## 2024-03-27 DIAGNOSIS — N40.0 BENIGN PROSTATIC HYPERPLASIA WITHOUT LOWER URINARY TRACT SYMPTOMS: ICD-10-CM

## 2024-03-27 DIAGNOSIS — R55 SYNCOPE AND COLLAPSE: ICD-10-CM

## 2024-03-27 DIAGNOSIS — I47.10 SUPRAVENTRICULAR TACHYCARDIA, UNSPECIFIED: ICD-10-CM

## 2024-03-27 DIAGNOSIS — K21.9 GASTRO-ESOPHAGEAL REFLUX DISEASE WITHOUT ESOPHAGITIS: ICD-10-CM

## 2024-03-27 DIAGNOSIS — I71.40 ABDOMINAL AORTIC ANEURYSM, WITHOUT RUPTURE, UNSPECIFIED: ICD-10-CM

## 2024-03-27 DIAGNOSIS — K59.00 CONSTIPATION, UNSPECIFIED: ICD-10-CM

## 2024-03-27 DIAGNOSIS — I77.1 STRICTURE OF ARTERY: ICD-10-CM

## 2024-03-27 DIAGNOSIS — I10 ESSENTIAL (PRIMARY) HYPERTENSION: ICD-10-CM

## 2024-03-27 DIAGNOSIS — R14.1 GAS PAIN: ICD-10-CM

## 2024-03-27 DIAGNOSIS — K22.4 DYSKINESIA OF ESOPHAGUS: ICD-10-CM

## 2024-04-01 PROBLEM — Z00.00 ENCOUNTER FOR PREVENTIVE HEALTH EXAMINATION: Status: ACTIVE | Noted: 2024-04-01

## 2025-02-13 NOTE — ED PROVIDER NOTE - CHIEF COMPLAINT
[Feeling Fatigued] : feeling fatigued [Negative] : Heme/Lymph [SOB] : no shortness of breath [Dyspnea on exertion] : not dyspnea during exertion [Chest Discomfort] : no chest discomfort [Lower Ext Edema] : no extremity edema [Leg Claudication] : no intermittent leg claudication The patient is a 73y Male complaining of chest pain. [Palpitations] : no palpitations [Orthopnea] : no orthopnea [PND] : no PND [Syncope] : no syncope [Cough] : no cough [Coughing Up Blood] : no hemoptysis [Myalgia] : no myalgia [Rash] : no rash [FreeTextEntry5] : As per HPI

## 2025-06-06 ENCOUNTER — INPATIENT (INPATIENT)
Facility: HOSPITAL | Age: 75
LOS: 3 days | Discharge: HOME | DRG: 149 | End: 2025-06-10
Attending: STUDENT IN AN ORGANIZED HEALTH CARE EDUCATION/TRAINING PROGRAM | Admitting: INTERNAL MEDICINE
Payer: MEDICARE

## 2025-06-06 VITALS
HEART RATE: 82 BPM | RESPIRATION RATE: 18 BRPM | TEMPERATURE: 97 F | SYSTOLIC BLOOD PRESSURE: 139 MMHG | OXYGEN SATURATION: 98 % | DIASTOLIC BLOOD PRESSURE: 88 MMHG

## 2025-06-06 LAB
ALBUMIN SERPL ELPH-MCNC: 4.9 G/DL — SIGNIFICANT CHANGE UP (ref 3.5–5.2)
ALP SERPL-CCNC: 103 U/L — SIGNIFICANT CHANGE UP (ref 30–115)
ALT FLD-CCNC: 17 U/L — SIGNIFICANT CHANGE UP (ref 0–41)
ANION GAP SERPL CALC-SCNC: 14 MMOL/L — SIGNIFICANT CHANGE UP (ref 7–14)
AST SERPL-CCNC: 21 U/L — SIGNIFICANT CHANGE UP (ref 0–41)
BASE EXCESS BLDV CALC-SCNC: 1.7 MMOL/L — SIGNIFICANT CHANGE UP (ref -2–3)
BASOPHILS # BLD AUTO: 0.01 K/UL — SIGNIFICANT CHANGE UP (ref 0–0.2)
BASOPHILS NFR BLD AUTO: 0.1 % — SIGNIFICANT CHANGE UP (ref 0–1)
BILIRUB SERPL-MCNC: 0.7 MG/DL — SIGNIFICANT CHANGE UP (ref 0.2–1.2)
BUN SERPL-MCNC: 9 MG/DL — LOW (ref 10–20)
CA-I SERPL-SCNC: 1.09 MMOL/L — LOW (ref 1.15–1.33)
CALCIUM SERPL-MCNC: 9.2 MG/DL — SIGNIFICANT CHANGE UP (ref 8.4–10.5)
CHLORIDE SERPL-SCNC: 97 MMOL/L — LOW (ref 98–110)
CO2 SERPL-SCNC: 24 MMOL/L — SIGNIFICANT CHANGE UP (ref 17–32)
CREAT SERPL-MCNC: 0.5 MG/DL — LOW (ref 0.7–1.5)
EGFR: 106 ML/MIN/1.73M2 — SIGNIFICANT CHANGE UP
EGFR: 106 ML/MIN/1.73M2 — SIGNIFICANT CHANGE UP
EOSINOPHIL # BLD AUTO: 0.01 K/UL — SIGNIFICANT CHANGE UP (ref 0–0.7)
EOSINOPHIL NFR BLD AUTO: 0.1 % — SIGNIFICANT CHANGE UP (ref 0–8)
GAS PNL BLDV: 132 MMOL/L — LOW (ref 136–145)
GAS PNL BLDV: SIGNIFICANT CHANGE UP
GLUCOSE SERPL-MCNC: 156 MG/DL — HIGH (ref 70–99)
HCO3 BLDV-SCNC: 28 MMOL/L — SIGNIFICANT CHANGE UP (ref 22–29)
HCT VFR BLD CALC: 40.5 % — LOW (ref 42–52)
HCT VFR BLDA CALC: 42 % — SIGNIFICANT CHANGE UP (ref 39–51)
HGB BLD CALC-MCNC: 13.9 G/DL — SIGNIFICANT CHANGE UP (ref 12.6–17.4)
HGB BLD-MCNC: 13.7 G/DL — LOW (ref 14–18)
IMM GRANULOCYTES NFR BLD AUTO: 0.4 % — HIGH (ref 0.1–0.3)
LACTATE BLDV-MCNC: 2.5 MMOL/L — HIGH (ref 0.5–2)
LYMPHOCYTES # BLD AUTO: 0.73 K/UL — LOW (ref 1.2–3.4)
LYMPHOCYTES # BLD AUTO: 9.1 % — LOW (ref 20.5–51.1)
MAGNESIUM SERPL-MCNC: 2.4 MG/DL — SIGNIFICANT CHANGE UP (ref 1.8–2.4)
MCHC RBC-ENTMCNC: 31.6 PG — HIGH (ref 27–31)
MCHC RBC-ENTMCNC: 33.8 G/DL — SIGNIFICANT CHANGE UP (ref 32–37)
MCV RBC AUTO: 93.3 FL — SIGNIFICANT CHANGE UP (ref 80–94)
MONOCYTES # BLD AUTO: 0.25 K/UL — SIGNIFICANT CHANGE UP (ref 0.1–0.6)
MONOCYTES NFR BLD AUTO: 3.1 % — SIGNIFICANT CHANGE UP (ref 1.7–9.3)
NEUTROPHILS # BLD AUTO: 7.01 K/UL — HIGH (ref 1.4–6.5)
NEUTROPHILS NFR BLD AUTO: 87.2 % — HIGH (ref 42.2–75.2)
NRBC BLD AUTO-RTO: 0 /100 WBCS — SIGNIFICANT CHANGE UP (ref 0–0)
PCO2 BLDV: 48 MMHG — SIGNIFICANT CHANGE UP (ref 42–55)
PH BLDV: 7.37 — SIGNIFICANT CHANGE UP (ref 7.32–7.43)
PLATELET # BLD AUTO: 184 K/UL — SIGNIFICANT CHANGE UP (ref 130–400)
PMV BLD: 9.7 FL — SIGNIFICANT CHANGE UP (ref 7.4–10.4)
PO2 BLDV: 43 MMHG — SIGNIFICANT CHANGE UP (ref 25–45)
POTASSIUM BLDV-SCNC: 3.4 MMOL/L — LOW (ref 3.5–5.1)
POTASSIUM SERPL-MCNC: 3.5 MMOL/L — SIGNIFICANT CHANGE UP (ref 3.5–5)
POTASSIUM SERPL-SCNC: 3.5 MMOL/L — SIGNIFICANT CHANGE UP (ref 3.5–5)
PROT SERPL-MCNC: 7.6 G/DL — SIGNIFICANT CHANGE UP (ref 6–8)
RBC # BLD: 4.34 M/UL — LOW (ref 4.7–6.1)
RBC # FLD: 12 % — SIGNIFICANT CHANGE UP (ref 11.5–14.5)
SAO2 % BLDV: 75.2 % — SIGNIFICANT CHANGE UP (ref 67–88)
SODIUM SERPL-SCNC: 135 MMOL/L — SIGNIFICANT CHANGE UP (ref 135–146)
TROPONIN T, HIGH SENSITIVITY RESULT: 11 NG/L — SIGNIFICANT CHANGE UP (ref 6–21)
TROPONIN T, HIGH SENSITIVITY RESULT: 13 NG/L — SIGNIFICANT CHANGE UP (ref 6–21)
WBC # BLD: 8.04 K/UL — SIGNIFICANT CHANGE UP (ref 4.8–10.8)
WBC # FLD AUTO: 8.04 K/UL — SIGNIFICANT CHANGE UP (ref 4.8–10.8)

## 2025-06-06 PROCEDURE — 74174 CTA ABD&PLVS W/CONTRAST: CPT | Mod: 26

## 2025-06-06 PROCEDURE — 93010 ELECTROCARDIOGRAM REPORT: CPT

## 2025-06-06 PROCEDURE — 71045 X-RAY EXAM CHEST 1 VIEW: CPT | Mod: 26

## 2025-06-06 PROCEDURE — 70450 CT HEAD/BRAIN W/O DYE: CPT | Mod: 26

## 2025-06-06 PROCEDURE — 71275 CT ANGIOGRAPHY CHEST: CPT | Mod: 26

## 2025-06-06 PROCEDURE — 99285 EMERGENCY DEPT VISIT HI MDM: CPT

## 2025-06-06 RX ORDER — ACETAMINOPHEN 500 MG/5ML
975 LIQUID (ML) ORAL ONCE
Refills: 0 | Status: COMPLETED | OUTPATIENT
Start: 2025-06-06 | End: 2025-06-06

## 2025-06-06 RX ORDER — METOCLOPRAMIDE HCL 10 MG
10 TABLET ORAL ONCE
Refills: 0 | Status: COMPLETED | OUTPATIENT
Start: 2025-06-06 | End: 2025-06-06

## 2025-06-06 RX ADMIN — Medication 500 MILLILITER(S): at 21:43

## 2025-06-06 RX ADMIN — Medication 104 MILLIGRAM(S): at 20:28

## 2025-06-06 NOTE — H&P ADULT - NS ATTEND AMEND GEN_ALL_CORE FT
old records and current data reviewed, as above (though chest pain is concerning, will repeat troponin) old records and current data reviewed, as above (though stable troponin is reassuring, will repeat troponin in am) old records and current data reviewed, as above (though stable troponin is reassuring, will repeat troponin in am - Informed that no telemetry beds are currently available)

## 2025-06-06 NOTE — ED PROVIDER NOTE - CLINICAL SUMMARY MEDICAL DECISION MAKING FREE TEXT BOX
74-year-old male, history of HTN, HLD, DM, GERD, presents with dizziness, near syncope and chest pain today.  Exam unremarkable.  Labs notable for WBC 8, hemoglobin 13, BUN 9, creatinine 0.5, troponin 11, 13, lactate 2.5.  EKG normal sinus rhythm with nonspecific changes.  Chest x-ray no acute disease.  Head CT negative.  CTA 3.8 cm infrarenal AAA, no extravasation, no dissection. 74-year-old male, history of HTN, HLD, DM, GERD, presents with dizziness, near syncope and chest pain today.  Exam unremarkable.  Labs notable for WBC 8, hemoglobin 13, BUN 9, creatinine 0.5, troponin 11, 13, lactate 2.5.  EKG normal sinus rhythm with nonspecific changes.  Chest x-ray no acute disease.  Head CT negative.  CTA 3.8 cm infrarenal AAA, no extravasation, no dissection.  Will admit to tele.

## 2025-06-06 NOTE — H&P ADULT - HISTORY OF PRESENT ILLNESS
74-year-old male with past medical history HTN, HLD, DM, reflux presents with complaint of dizziness and presyncope.  Reports at 1 PM today he got up from laying down and felt like he would pass out.  States he felt afterwards like he was weak and had chest pain while he was standing up.  Daughter Daniella at bedside states that patient has not felt well since the presyncopal episode.  States that patient reports he did not fully lose consciousness at any time.  Reports he had a headache earlier that is now resolved without intervention.  Denies focal numbness/weakness, abdominal pain, nausea/vomiting/diarrhea, change in bowel/bladder habits.    Home meds provided by daughter.

## 2025-06-06 NOTE — ED PROVIDER NOTE - PHYSICAL EXAMINATION
Vital Signs: I have reviewed the initial vital signs.  Constitutional: appears stated age, no acute distress  Eyes: Sclera clear, EOMI.  Cardiovascular: S1 and S2, regular rate, regular rhythm, well-perfused extremities, radial pulses equal and 2+, pedal pulses 2+ and equal  Respiratory: unlabored respiratory effort, clear to auscultation bilaterally no wheezing, rales, or rhonchi  Gastrointestinal:  abdomen soft, non-tender  Musculoskeletal: supple neck, no lower extremity edema  Integumentary: warm, dry, no rash  NEURO: AAOx3, following commands, no facial droop, no gaze palsy, no visual loss, no drift in B/L UEs and B/L LEs, normal finger-to-nose bilaterally, no sensory loss, no aphasia, no extinction/inattention/neglect

## 2025-06-06 NOTE — H&P ADULT - ASSESSMENT
74-year-old male with past medical history HTN, HLD, DM, reflux presents with complaint of dizziness and presyncope.  Reports at 1 PM today he got up from laying down and felt like he would pass out.  States he felt afterwards like he was weak and had chest pain while he was standing up.  Daughter Daniella at bedside states that patient has not felt well since the presyncopal episode.  States that patient reports he did not fully lose consciousness at any time.  Reports he had a headache earlier that is now resolved without intervention.  Denies focal numbness/weakness, abdominal pain, nausea/vomiting/diarrhea, change in bowel/bladder habits.      # Dizziness   - 2D Echo  - EKG in am  - PT    # HTN  - VS  - c/w home med    # HLD  - c/w home med    # GERD  - c/w home med

## 2025-06-06 NOTE — ED PROVIDER NOTE - ATTENDING APP SHARED VISIT CONTRIBUTION OF CARE
74-year-old male, history of HTN, HLD, DM, GERD, presents with dizziness, near syncope and chest pain today.  Exam shows alert patient in no distress, HEENT NCAT PERRL, neck supple, lungs clear, RR S1S2, abdomen soft NT +BS, no CCE, skin no rash, neuro A&OX3 GCS 15 no deficits.

## 2025-06-07 DIAGNOSIS — R42 DIZZINESS AND GIDDINESS: ICD-10-CM

## 2025-06-07 PROBLEM — I10 ESSENTIAL (PRIMARY) HYPERTENSION: Chronic | Status: ACTIVE | Noted: 2024-03-16

## 2025-06-07 PROBLEM — E78.00 PURE HYPERCHOLESTEROLEMIA, UNSPECIFIED: Chronic | Status: ACTIVE | Noted: 2024-03-16

## 2025-06-07 LAB
ALBUMIN SERPL ELPH-MCNC: 4.3 G/DL — SIGNIFICANT CHANGE UP (ref 3.5–5.2)
ALP SERPL-CCNC: 94 U/L — SIGNIFICANT CHANGE UP (ref 30–115)
ALT FLD-CCNC: 15 U/L — SIGNIFICANT CHANGE UP (ref 0–41)
ANION GAP SERPL CALC-SCNC: 12 MMOL/L — SIGNIFICANT CHANGE UP (ref 7–14)
AST SERPL-CCNC: 19 U/L — SIGNIFICANT CHANGE UP (ref 0–41)
BASOPHILS # BLD AUTO: 0.01 K/UL — SIGNIFICANT CHANGE UP (ref 0–0.2)
BASOPHILS NFR BLD AUTO: 0.1 % — SIGNIFICANT CHANGE UP (ref 0–1)
BILIRUB SERPL-MCNC: 0.8 MG/DL — SIGNIFICANT CHANGE UP (ref 0.2–1.2)
BUN SERPL-MCNC: 9 MG/DL — LOW (ref 10–20)
CALCIUM SERPL-MCNC: 8.8 MG/DL — SIGNIFICANT CHANGE UP (ref 8.4–10.5)
CHLORIDE SERPL-SCNC: 103 MMOL/L — SIGNIFICANT CHANGE UP (ref 98–110)
CO2 SERPL-SCNC: 25 MMOL/L — SIGNIFICANT CHANGE UP (ref 17–32)
CREAT SERPL-MCNC: 0.6 MG/DL — LOW (ref 0.7–1.5)
EGFR: 100 ML/MIN/1.73M2 — SIGNIFICANT CHANGE UP
EGFR: 100 ML/MIN/1.73M2 — SIGNIFICANT CHANGE UP
EOSINOPHIL # BLD AUTO: 0.03 K/UL — SIGNIFICANT CHANGE UP (ref 0–0.7)
EOSINOPHIL NFR BLD AUTO: 0.4 % — SIGNIFICANT CHANGE UP (ref 0–8)
GLUCOSE SERPL-MCNC: 137 MG/DL — HIGH (ref 70–99)
HCT VFR BLD CALC: 40.3 % — LOW (ref 42–52)
HGB BLD-MCNC: 13.3 G/DL — LOW (ref 14–18)
IMM GRANULOCYTES NFR BLD AUTO: 0.3 % — SIGNIFICANT CHANGE UP (ref 0.1–0.3)
LYMPHOCYTES # BLD AUTO: 1.2 K/UL — SIGNIFICANT CHANGE UP (ref 1.2–3.4)
LYMPHOCYTES # BLD AUTO: 15.9 % — LOW (ref 20.5–51.1)
MCHC RBC-ENTMCNC: 31.1 PG — HIGH (ref 27–31)
MCHC RBC-ENTMCNC: 33 G/DL — SIGNIFICANT CHANGE UP (ref 32–37)
MCV RBC AUTO: 94.2 FL — HIGH (ref 80–94)
MONOCYTES # BLD AUTO: 0.49 K/UL — SIGNIFICANT CHANGE UP (ref 0.1–0.6)
MONOCYTES NFR BLD AUTO: 6.5 % — SIGNIFICANT CHANGE UP (ref 1.7–9.3)
NEUTROPHILS # BLD AUTO: 5.82 K/UL — SIGNIFICANT CHANGE UP (ref 1.4–6.5)
NEUTROPHILS NFR BLD AUTO: 76.8 % — HIGH (ref 42.2–75.2)
NRBC BLD AUTO-RTO: 0 /100 WBCS — SIGNIFICANT CHANGE UP (ref 0–0)
PLATELET # BLD AUTO: 179 K/UL — SIGNIFICANT CHANGE UP (ref 130–400)
PMV BLD: 9.7 FL — SIGNIFICANT CHANGE UP (ref 7.4–10.4)
POTASSIUM SERPL-MCNC: 3.3 MMOL/L — LOW (ref 3.5–5)
POTASSIUM SERPL-SCNC: 3.3 MMOL/L — LOW (ref 3.5–5)
PROT SERPL-MCNC: 7.1 G/DL — SIGNIFICANT CHANGE UP (ref 6–8)
RBC # BLD: 4.28 M/UL — LOW (ref 4.7–6.1)
RBC # FLD: 11.9 % — SIGNIFICANT CHANGE UP (ref 11.5–14.5)
SODIUM SERPL-SCNC: 140 MMOL/L — SIGNIFICANT CHANGE UP (ref 135–146)
TROPONIN T, HIGH SENSITIVITY RESULT: 15 NG/L — SIGNIFICANT CHANGE UP (ref 6–21)
WBC # BLD: 7.57 K/UL — SIGNIFICANT CHANGE UP (ref 4.8–10.8)
WBC # FLD AUTO: 7.57 K/UL — SIGNIFICANT CHANGE UP (ref 4.8–10.8)

## 2025-06-07 PROCEDURE — 97162 PT EVAL MOD COMPLEX 30 MIN: CPT | Mod: GP

## 2025-06-07 PROCEDURE — 83605 ASSAY OF LACTIC ACID: CPT

## 2025-06-07 PROCEDURE — 85025 COMPLETE CBC W/AUTO DIFF WBC: CPT

## 2025-06-07 PROCEDURE — 83735 ASSAY OF MAGNESIUM: CPT

## 2025-06-07 PROCEDURE — 36415 COLL VENOUS BLD VENIPUNCTURE: CPT

## 2025-06-07 PROCEDURE — 80048 BASIC METABOLIC PNL TOTAL CA: CPT

## 2025-06-07 PROCEDURE — 93306 TTE W/DOPPLER COMPLETE: CPT

## 2025-06-07 PROCEDURE — 84484 ASSAY OF TROPONIN QUANT: CPT

## 2025-06-07 PROCEDURE — A9500: CPT

## 2025-06-07 PROCEDURE — 93005 ELECTROCARDIOGRAM TRACING: CPT

## 2025-06-07 PROCEDURE — 93017 CV STRESS TEST TRACING ONLY: CPT

## 2025-06-07 PROCEDURE — 99223 1ST HOSP IP/OBS HIGH 75: CPT

## 2025-06-07 PROCEDURE — 93010 ELECTROCARDIOGRAM REPORT: CPT

## 2025-06-07 PROCEDURE — 80053 COMPREHEN METABOLIC PANEL: CPT

## 2025-06-07 PROCEDURE — 78452 HT MUSCLE IMAGE SPECT MULT: CPT

## 2025-06-07 PROCEDURE — 99233 SBSQ HOSP IP/OBS HIGH 50: CPT

## 2025-06-07 RX ORDER — DEXLANSOPRAZOLE 60 MG/1
1 CAPSULE, DELAYED RELEASE ORAL
Refills: 0 | DISCHARGE

## 2025-06-07 RX ORDER — MAGNESIUM, ALUMINUM HYDROXIDE 200-200 MG
30 TABLET,CHEWABLE ORAL EVERY 4 HOURS
Refills: 0 | Status: DISCONTINUED | OUTPATIENT
Start: 2025-06-07 | End: 2025-06-10

## 2025-06-07 RX ORDER — ATORVASTATIN CALCIUM 80 MG/1
20 TABLET, FILM COATED ORAL AT BEDTIME
Refills: 0 | Status: DISCONTINUED | OUTPATIENT
Start: 2025-06-07 | End: 2025-06-10

## 2025-06-07 RX ORDER — ONDANSETRON HCL/PF 4 MG/2 ML
4 VIAL (ML) INJECTION EVERY 8 HOURS
Refills: 0 | Status: DISCONTINUED | OUTPATIENT
Start: 2025-06-07 | End: 2025-06-10

## 2025-06-07 RX ORDER — ENOXAPARIN SODIUM 100 MG/ML
40 INJECTION SUBCUTANEOUS EVERY 24 HOURS
Refills: 0 | Status: DISCONTINUED | OUTPATIENT
Start: 2025-06-07 | End: 2025-06-10

## 2025-06-07 RX ORDER — ASPIRIN 325 MG
81 TABLET ORAL DAILY
Refills: 0 | Status: DISCONTINUED | OUTPATIENT
Start: 2025-06-07 | End: 2025-06-10

## 2025-06-07 RX ORDER — MECLIZINE HCL 12.5 MG
1 TABLET ORAL
Refills: 0 | DISCHARGE

## 2025-06-07 RX ORDER — AMLODIPINE BESYLATE 10 MG/1
2.5 TABLET ORAL DAILY
Refills: 0 | Status: DISCONTINUED | OUTPATIENT
Start: 2025-06-07 | End: 2025-06-10

## 2025-06-07 RX ORDER — SODIUM CHLORIDE 9 G/1000ML
1000 INJECTION, SOLUTION INTRAVENOUS
Refills: 0 | Status: DISCONTINUED | OUTPATIENT
Start: 2025-06-07 | End: 2025-06-10

## 2025-06-07 RX ORDER — TAMSULOSIN HYDROCHLORIDE 0.4 MG/1
0.4 CAPSULE ORAL AT BEDTIME
Refills: 0 | Status: DISCONTINUED | OUTPATIENT
Start: 2025-06-07 | End: 2025-06-10

## 2025-06-07 RX ORDER — ACETAMINOPHEN 500 MG/5ML
650 LIQUID (ML) ORAL EVERY 6 HOURS
Refills: 0 | Status: DISCONTINUED | OUTPATIENT
Start: 2025-06-07 | End: 2025-06-10

## 2025-06-07 RX ORDER — MELATONIN 5 MG
3 TABLET ORAL AT BEDTIME
Refills: 0 | Status: DISCONTINUED | OUTPATIENT
Start: 2025-06-07 | End: 2025-06-10

## 2025-06-07 RX ORDER — MECLIZINE HCL 12.5 MG
25 TABLET ORAL DAILY
Refills: 0 | Status: DISCONTINUED | OUTPATIENT
Start: 2025-06-07 | End: 2025-06-10

## 2025-06-07 RX ADMIN — AMLODIPINE BESYLATE 2.5 MILLIGRAM(S): 10 TABLET ORAL at 05:21

## 2025-06-07 RX ADMIN — Medication 20 MILLIEQUIVALENT(S): at 14:38

## 2025-06-07 RX ADMIN — SODIUM CHLORIDE 75 MILLILITER(S): 9 INJECTION, SOLUTION INTRAVENOUS at 14:38

## 2025-06-07 RX ADMIN — Medication 20 MILLIEQUIVALENT(S): at 17:33

## 2025-06-07 RX ADMIN — ENOXAPARIN SODIUM 40 MILLIGRAM(S): 100 INJECTION SUBCUTANEOUS at 14:38

## 2025-06-07 RX ADMIN — Medication 40 MILLIGRAM(S): at 05:21

## 2025-06-07 RX ADMIN — Medication 1 APPLICATION(S): at 05:22

## 2025-06-07 RX ADMIN — Medication 3 MILLIGRAM(S): at 21:32

## 2025-06-07 RX ADMIN — TAMSULOSIN HYDROCHLORIDE 0.4 MILLIGRAM(S): 0.4 CAPSULE ORAL at 21:32

## 2025-06-07 RX ADMIN — Medication 81 MILLIGRAM(S): at 12:16

## 2025-06-07 RX ADMIN — ATORVASTATIN CALCIUM 20 MILLIGRAM(S): 80 TABLET, FILM COATED ORAL at 21:32

## 2025-06-07 NOTE — CONSULT NOTE ADULT - ASSESSMENT
Impression:  74-year-old male with past medical history HTN, HLD, DM, reflux presents with complaint of dizziness and presyncope.  Reports at 1 PM today he got up from laying down and felt like he would pass out.  States he felt afterwards like he was weak and had chest pain while he was standing up.  Daughter Daniella at bedside states that patient has not felt well since the presyncopal episode.  States that patient reports he did not fully lose consciousness at any time.  Reports he had a headache earlier that is now resolved without intervention.  Denies focal numbness/weakness, abdominal pain, nausea/vomiting/diarrhea, change in bowel/bladder habits.    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          Plan:  - Case d/w     Impression:  74-year-old male with past medical history HTN, HLD, DM, reflux presents with complaint of dizziness and presyncope.  Reports at 1 PM today he got up from laying down and felt like he would pass out.  States he felt afterwards like he was weak and had chest pain while he was standing up.  Daughter Daniella at bedside states that patient has not felt well since the presyncopal episode.  States that patient reports he did not fully lose consciousness at any time.  Reports he had a headache earlier that is now resolved without intervention.  Denies focal numbness/weakness, abdominal pain, nausea/vomiting/diarrhea, change in bowel/bladder habits.    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   Patient had similar findings March 20, 2024 at [facility] and Vascular was consulted and patient did not require inpatient intervention and was to follow up with Dr Scott after discharge but never followed up.           Plan:  - Case d/w Dr. Link (Vascular fellow) and CT scan findings discussed and he states No acute inpatient Vascular intervention needed and patient can follow up with Dr. Pedro as outpatient.   - Continue medical management.  - I relayed Vascular recommendations for outpatient follow up to Dr. Corcoran, Patient and patient's daughter Daniella.    - Patient and patient's daughter Daniella (who relayed to her father) and they verbalized understanding of recommendations & instructions and all questions answered their satisfaction.   - Vascular to sign off and may recall if needed while in hospital.

## 2025-06-07 NOTE — PROGRESS NOTE ADULT - SUBJECTIVE AND OBJECTIVE BOX
SUBJECTIVE:    Patient is a 74y old Male who presents with a chief complaint of   Currently admitted to medicine with the primary diagnosis of Dizziness       Today is hospital day . This morning he is resting comfortably in bed and reports no new issues or overnight events.  reports feeling well    mandarin speaking- proffered daughter Daniella to translate on phone       PAST MEDICAL & SURGICAL HISTORY  Hypertension    High cholesterol      SOCIAL HISTORY:    ALLERGIES:  fish (Urticaria)  No Known Drug Allergies    MEDICATIONS:  STANDING MEDICATIONS  amLODIPine   Tablet 2.5 milliGRAM(s) Oral daily  aspirin enteric coated 81 milliGRAM(s) Oral daily  atorvastatin 20 milliGRAM(s) Oral at bedtime  chlorhexidine 2% Cloths 1 Application(s) Topical <User Schedule>  pantoprazole    Tablet 40 milliGRAM(s) Oral before breakfast  tamsulosin 0.4 milliGRAM(s) Oral at bedtime    PRN MEDICATIONS  acetaminophen     Tablet .. 650 milliGRAM(s) Oral every 6 hours PRN  aluminum hydroxide/magnesium hydroxide/simethicone Suspension 30 milliLiter(s) Oral every 4 hours PRN  meclizine 25 milliGRAM(s) Oral daily PRN  melatonin 3 milliGRAM(s) Oral at bedtime PRN  ondansetron Injectable 4 milliGRAM(s) IV Push every 8 hours PRN    VITALS:   T(F): 98.4  HR: 68  BP: 155/91  RR: 18  SpO2: 98%    LABS:                          13.3   7.57  )-----------( 179      ( 07 Jun 2025 08:49 )             40.3     06-07    140  |  103  |  9[L]  ----------------------------<  137[H]  3.3[L]   |  25  |  0.6[L]    Ca    8.8      07 Jun 2025 08:49  Mg     2.4     06-06    TPro  7.1  /  Alb  4.3  /  TBili  0.8  /  DBili  x   /  AST  19  /  ALT  15  /  AlkPhos  94  06-07      Urinalysis Basic - ( 07 Jun 2025 08:49 )    Color: x / Appearance: x / SG: x / pH: x  Gluc: 137 mg/dL / Ketone: x  / Bili: x / Urobili: x   Blood: x / Protein: x / Nitrite: x   Leuk Esterase: x / RBC: x / WBC x   Sq Epi: x / Non Sq Epi: x / Bacteria: x                RADIOLOGY:    PHYSICAL EXAM:  GEN: No acute distress  HEENT: normocephalic, atraumatic, aniceteric  LUNGS: Clear to auscultation bilaterally, no rales/wheezing/ rhonchi  HEART: S1/S2 present. RRR, no murmurs  ABD: Soft, non-tender, non-distended. Bowel sounds present  EXT: warm , no edema   NEURO: AAOX3, normal affect      ASSESSMENT AND PLAN:    74-year-old male with past medical history HTN, HLD, DM, reflux presents with complaint of dizziness and presyncope.  Reports at 1 PM today he got up from laying down and felt like he would pass out.  States he felt afterwards like he was weak and had chest pain while he was standing up.  Daughter Daniella at bedside states that patient has not felt well since the presyncopal episode.  States that patient reports he did not fully lose consciousness at any time.  Reports he had a headache earlier that is now resolved without intervention.  Denies focal numbness/weakness, abdominal pain, nausea/vomiting/diarrhea, change in bowel/bladder habits.      # Dizziness / Pre- Syncope  # Orthostatic (+)   - CTH negative   - trop 13 > 15 ; delta negative  - EKG NSR  - fu TTE   - repeat orthostatic vital signs   - meclizine prn    - lamine stocking  - monitor on telemetry     # H/O HTN -cw amlodipine     # H/O HLD - cw statin     dvt/ gi ppx/diet  dispo: acute- telemetry - poss 48 hrs      SUBJECTIVE:    Patient is a 74y old Male who presents with a chief complaint of   Currently admitted to medicine with the primary diagnosis of Dizziness       Today is hospital day . This morning he is resting comfortably in bed and reports no new issues or overnight events.  reports feeling well    mandarin speaking- proffered daughter Daniella to translate on phone       PAST MEDICAL & SURGICAL HISTORY  Hypertension    High cholesterol      SOCIAL HISTORY:    ALLERGIES:  fish (Urticaria)  No Known Drug Allergies    MEDICATIONS:  STANDING MEDICATIONS  amLODIPine   Tablet 2.5 milliGRAM(s) Oral daily  aspirin enteric coated 81 milliGRAM(s) Oral daily  atorvastatin 20 milliGRAM(s) Oral at bedtime  chlorhexidine 2% Cloths 1 Application(s) Topical <User Schedule>  pantoprazole    Tablet 40 milliGRAM(s) Oral before breakfast  tamsulosin 0.4 milliGRAM(s) Oral at bedtime    PRN MEDICATIONS  acetaminophen     Tablet .. 650 milliGRAM(s) Oral every 6 hours PRN  aluminum hydroxide/magnesium hydroxide/simethicone Suspension 30 milliLiter(s) Oral every 4 hours PRN  meclizine 25 milliGRAM(s) Oral daily PRN  melatonin 3 milliGRAM(s) Oral at bedtime PRN  ondansetron Injectable 4 milliGRAM(s) IV Push every 8 hours PRN    VITALS:   T(F): 98.4  HR: 68  BP: 155/91  RR: 18  SpO2: 98%    LABS:                          13.3   7.57  )-----------( 179      ( 07 Jun 2025 08:49 )             40.3     06-07    140  |  103  |  9[L]  ----------------------------<  137[H]  3.3[L]   |  25  |  0.6[L]    Ca    8.8      07 Jun 2025 08:49  Mg     2.4     06-06    TPro  7.1  /  Alb  4.3  /  TBili  0.8  /  DBili  x   /  AST  19  /  ALT  15  /  AlkPhos  94  06-07      Urinalysis Basic - ( 07 Jun 2025 08:49 )    Color: x / Appearance: x / SG: x / pH: x  Gluc: 137 mg/dL / Ketone: x  / Bili: x / Urobili: x   Blood: x / Protein: x / Nitrite: x   Leuk Esterase: x / RBC: x / WBC x   Sq Epi: x / Non Sq Epi: x / Bacteria: x                RADIOLOGY:    PHYSICAL EXAM:  GEN: No acute distress  HEENT: normocephalic, atraumatic, aniceteric  LUNGS: Clear to auscultation bilaterally, no rales/wheezing/ rhonchi  HEART: S1/S2 present. RRR, no murmurs  ABD: Soft, non-tender, non-distended. Bowel sounds present  EXT: warm , no edema   NEURO: AAOX3, normal affect      ASSESSMENT AND PLAN:    74-year-old male with past medical history HTN, HLD, DM, reflux presents with complaint of dizziness and presyncope.  Reports at 1 PM today he got up from laying down and felt like he would pass out.  States he felt afterwards like he was weak and had chest pain while he was standing up.  Daughter Daniella at bedside states that patient has not felt well since the presyncopal episode.  States that patient reports he did not fully lose consciousness at any time.  Reports he had a headache earlier that is now resolved without intervention.  Denies focal numbness/weakness, abdominal pain, nausea/vomiting/diarrhea, change in bowel/bladder habits.      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  #  Infrarenal abdominal aortic aneurysm measuring 3.8 cm with eccentric   mural thrombus (previously 3.3 cm).  - vascular sx team  - did not recommend any intervention  , cw asa/statin home dose   - recomend fu op vasc Dr. Pedro     # H/O HTN -cw amlodipine     # H/O HLD - cw statin     dvt/ gi ppx/diet  dispo: acute- telemetry - poss 48 hrs

## 2025-06-07 NOTE — PATIENT PROFILE ADULT - FALL HARM RISK - HARM RISK INTERVENTIONS
Assistance with ambulation/Assistance OOB with selected safe patient handling equipment/Communicate Risk of Fall with Harm to all staff/Discuss with provider need for PT consult/Monitor gait and stability/Provide patient with walking aids - walker, cane, crutches/Reinforce activity limits and safety measures with patient and family/Tailored Fall Risk Interventions/Use of alarms - bed, chair and/or voice tab/Visual Cue: Yellow wristband and red socks/Bed in lowest position, wheels locked, appropriate side rails in place/Call bell, personal items and telephone in reach/Instruct patient to call for assistance before getting out of bed or chair/Non-slip footwear when patient is out of bed/[place] to call system/Physically safe environment - no spills, clutter or unnecessary equipment/Purposeful Proactive Rounding/Room/bathroom lighting operational, light cord in reach

## 2025-06-07 NOTE — ED ADULT NURSE NOTE - NSFALLHARMRISKINTERV_ED_ALL_ED
Assistance OOB with selected safe patient handling equipment if applicable/Assistance with ambulation/Communicate risk of Fall with Harm to all staff, patient, and family/Encourage patient to sit up slowly, dangle for a short time, stand at bedside before walking/Monitor gait and stability/Orthostatic vital signs/Provide visual cue: red socks, yellow wristband, yellow gown, etc/Reinforce activity limits and safety measures with patient and family/Bed in lowest position, wheels locked, appropriate side rails in place/Call bell, personal items and telephone in reach/Instruct patient to call for assistance before getting out of bed/chair/stretcher/Non-slip footwear applied when patient is off stretcher/[place] to call system/Physically safe environment - no spills, clutter or unnecessary equipment/Purposeful Proactive Rounding/Room/bathroom lighting operational, light cord in reach

## 2025-06-07 NOTE — CONSULT NOTE ADULT - SUBJECTIVE AND OBJECTIVE BOX
.  HPI:  74-year-old male with past medical history HTN, HLD, DM, reflux presents with complaint of dizziness and presyncope.  Reports at 1 PM today he got up from laying down and felt like he would pass out.  States he felt afterwards like he was weak and had chest pain while he was standing up.  Daughter Daniella at bedside states that patient has not felt well since the presyncopal episode.  States that patient reports he did not fully lose consciousness at any time.  Reports he had a headache earlier that is now resolved without intervention.  Denies focal numbness/weakness, abdominal pain, nausea/vomiting/diarrhea, change in bowel/bladder habits.    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            PAST MEDICAL & SURGICAL HISTORY:  Hypertension    High cholesterol            MEDICATIONS  (STANDING):  amLODIPine   Tablet 2.5 milliGRAM(s) Oral daily  aspirin enteric coated 81 milliGRAM(s) Oral daily  atorvastatin 20 milliGRAM(s) Oral at bedtime  chlorhexidine 2% Cloths 1 Application(s) Topical <User Schedule>  pantoprazole    Tablet 40 milliGRAM(s) Oral before breakfast  tamsulosin 0.4 milliGRAM(s) Oral at bedtime    MEDICATIONS  (PRN):  acetaminophen     Tablet .. 650 milliGRAM(s) Oral every 6 hours PRN Temp greater or equal to 38C (100.4F), Mild Pain (1 - 3)  aluminum hydroxide/magnesium hydroxide/simethicone Suspension 30 milliLiter(s) Oral every 4 hours PRN Dyspepsia  meclizine 25 milliGRAM(s) Oral daily PRN for dizziness  melatonin 3 milliGRAM(s) Oral at bedtime PRN Insomnia  ondansetron Injectable 4 milliGRAM(s) IV Push every 8 hours PRN Nausea and/or Vomiting        Allergies  Fish (Urticaria)  No Known Drug Allergies        SOCIAL HISTORY:  Tobacco use:   Alcohol use:  Drug use:        FAMILY HISTORY:  Patient unsure of family history        REVIEW OF SYSTEMS:  CONSTITUTIONAL:  No weakness, fevers or chills  EYES/ENT:  No visual changes;  No vertigo or throat pain   NECK:  No pain or stiffness  RESPIRATORY:  No cough, wheezing, hemoptysis; No shortness of breath  CARDIOVASCULAR:  No chest pain or palpitations  GASTROINTESTINAL:  No abdominal or epigastric pain. No nausea, vomiting, or hematemesis; No diarrhea or constipation. No melena or hematochezia.  GENITOURINARY:  No dysuria, frequency or hematuria  MUSCULOSKELETAL:  FROM all extremities, normal strength, No calf tenderness  NEUROLOGICAL:  No numbness or weakness  SKIN:  No itching, rashes          Vital Signs Last 24 Hrs  T(C): 36.9 (07 Jun 2025 05:26), Max: 36.9 (07 Jun 2025 05:26)  T(F): 98.4 (07 Jun 2025 05:26), Max: 98.4 (07 Jun 2025 05:26)  HR: 68 (07 Jun 2025 05:26) (68 - 105)  BP: 155/91 (07 Jun 2025 05:26) (138/88 - 155/91)  RR: 18 (07 Jun 2025 05:26) (18 - 19)  SpO2: 98% (07 Jun 2025 05:26) (96% - 98%)    Parameters below as of 07 Jun 2025 05:26  Patient On (Oxygen Delivery Method): room air            PHYSICAL EXAM:    General:  WD, WN, male in NAD.    Skin:  Warm, dry, good color & turgor.    Back:  No spinal tenderness.     Respiratory:  CTA B/L.    Cardiovascular:  S1 & S2, RRR.    Gastrointestinal:  + BS, soft, no distention, non-tender, no rebound or guarding or peritoneal signs.    Genitourinary:  No suprapubic tenderness,  No CVAT.     Musculoskeletal:   Free painless ROM B/L upper and lower extremities, no current C/C/E.  2+/2+ dorsalis pedis and posterior tibialis pulses.  5+/5+ foot/ankle dorsi and plantar flexion.  No erythema, calf tenderness or palpable cords.  Absent Sage's sign B/L.        Neurological: No focal deficits. No erythema, calf tenderness or palpable cords.  Absent Sage's sign B/L.             LABS:                        13.3   7.57  )-----------( 179      ( 07 Jun 2025 08:49 )             40.3     06-07    140  |  103  |  9[L]  ----------------------------<  137[H]  3.3[L]   |  25  |  0.6[L]    Ca    8.8      07 Jun 2025 08:49  Mg     2.4     06-06    TPro  7.1  /  Alb  4.3  /  TBili  0.8  /  DBili  x   /  AST  19  /  ALT  15  /  AlkPhos  94  06-07        Urinalysis Basic - ( 07 Jun 2025 08:49 )    Color: x / Appearance: x / SG: x / pH: x  Gluc: 137 mg/dL / Ketone: x  / Bili: x / Urobili: x   Blood: x / Protein: x / Nitrite: x   Leuk Esterase: x / RBC: x / WBC x   Sq Epi: x / Non Sq Epi: x / Bacteria: x          RADIOLOGY & ADDITIONAL STUDIES    CT Angio Chest Aorta w/wo IV Cont (06.06.25 @ 21:17)   ACC: [id number] EXAM:  CT ANGIO CHEST AORTA WAWIC   ORDERED BY: GERARDO LEO     ACC: [id number] EXAM:  CT ANGIO ABD PELV (W)AW IC   ORDERED BY: GERARDO LEO     PROCEDURE DATE:  06/06/2025        INTERPRETATION:  CLINICAL INFORMATION: Chest pain; dizziness    COMPARISON: CT CHEST 3/16/2024.    CONTRAST/COMPLICATIONS:  IV Contrast: IV contrast documented in unlinked concurrent exam   (accession [id number]), Omnipaque 350 (accession [id number])  95 cc   administered   5 cc discarded  Oral Contrast: NONE.    PROCEDURE:  CT of the Chest, Abdomen and Pelvis was performed.  Sagittal and coronal reformats were performed.    FINDINGS:  CHEST:  LUNGS AND LARGE AIRWAYS: Bibasilar atelectasis. Patent central airways.   Left intrafissural nodule  PLEURA: No pleural effusion.  VESSELS: No thoracic aortic aneurysm or intramural hematoma  HEART: Cardiomegaly with right atrial enlargement. No pericardial   effusion.  MEDIASTINUM AND STU: No lymphadenopathy.  CHEST WALL AND LOWER NECK: Within normal limits.    ABDOMEN AND PELVIS:  LIVER: Stable arterial enhancing hepatic dome lesion, likely hemangioma  BILE DUCTS: Normal caliber.  GALLBLADDER: Within normal limits.  SPLEEN: Within normal limits.  PANCREAS: Within normal limits.  ADRENALS: Within normal limits.  KIDNEYS/URETERS: No renal stones or hydronephrosis. Renal cysts and other   subcentimeter hypodensities, too small to further characterize    BLADDER: Anterior urinary bladder wall diverticulum (sagittal series 605,   image 134)  REPRODUCTIVE ORGANS: Prostatomegaly with intravesicular extension    BOWEL: No bowel obstruction. Appendix is normal.  PERITONEUM/RETROPERITONEUM: Within normal limits.  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 Infrarenal abdominal aortic aneurysm measuring approximately   3.8 cm with eccentric mural thrombus (series 306, image 364), previously   3.3 cm. 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  LYMPH NODES: No lymphadenopathy.  ABDOMINAL WALL: Within normal limits.  BONES: Osteopenia. Partially visualized cervical spinal hardware.   Multilevel thoracolumbar degenerative changes with calcifications of the   anterior longitudinal ligament      IMPRESSION:  CHEST:  No thoracic aortic aneurysm, dissection or intramural hematoma.    ABDOMEN/PELVIS:  No intrarenal abdominal aorta dissection.    Infrarenal abdominal aortic aneurysm measuring 3.8 cm with eccentric   mural thrombus (previously 3.3 cm).    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    --- End of Report ---                 .  HPI:  74-year-old male with past medical history HTN, HLD, DM, reflux presents with complaint of dizziness and presyncope.  Reports at 1 PM today he got up from laying down and felt like he would pass out.  States he felt afterwards like he was weak and had chest pain while he was standing up.  Daughter Daniella at bedside states that patient has not felt well since the presyncopal episode.  States that patient reports he did not fully lose consciousness at any time.  Reports he had a headache earlier that is now resolved without intervention.  Denies focal numbness/weakness, abdominal pain, nausea/vomiting/diarrhea, change in bowel/bladder habits.    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    Patient's daughter Daniella translated Mandarin at patient's request and declines hospital .    Patient and daughter Daniella states he has been very weak and dizzy with Left lateral lower leg pain and has been unable to ambulate lately so came to the ER for evaluation and treatment.   Daniella states she was not aware of any aneurysms or thrombus in the past and not being treated with anticoagulation.    EMR reviewed and Patient had similar findings March 20, 2024 at [facility] and Vascular was consulted and patient did not require inpatient intervention and was to follow up with Dr Scott after discharge but never followed up.   Patient does not currently have a Vascular doctor and only followed by his PCP who is in [place].    Patient denies fever, chills, tremors, nausea, vomiting, diarrhea, constipation, hematemesis, hematochezia, melena, dysuria, pyuria, hematuria, chest pain or shortness of breath.            PAST MEDICAL & SURGICAL HISTORY:  Hypertension    High cholesterol    BPH    GERD    Aortic and Iliac artery stenosis per EMR from 3/2024    Neck surgery          Home Medications:  aluminum hydroxide-magnesium hydroxide 200 mg-200 mg/5 mL oral suspension: 30 milliliter(s) orally every 6 hours as needed for  indigestion (19 Mar 2024 14:30)  amLODIPine 2.5 mg oral tablet: 1 tab(s) orally once a day (16 Mar 2024 12:21)  aspirin 81 mg oral delayed release tablet: 1 tab(s) orally once a day (16 Mar 2024 12:16)  atorvastatin 20 mg oral tablet: 1 tab(s) orally once a day (16 Mar 2024 12:21)  Dexilant 60 mg oral delayed release capsule: 1 cap(s) orally once a day (07 Jun 2025 00:36)  famotidine 40 mg oral tablet: 1 tab(s) orally once a day (16 Mar 2024 12:21)  gabapentin 100 mg oral capsule: 1 cap(s) orally 2 times a day (19 Mar 2024 13:58)  hydrOXYzine pamoate 25 mg oral capsule: 1 cap(s) orally 3 times a day as needed for  allergy symptoms (16 Mar 2024 12:21)  loratadine 10 mg oral tablet: 1 tab(s) orally once a day (16 Mar 2024 12:21)  meclizine 25 mg oral tablet: 1 tab(s) orally once a day as needed for  dizziness (07 Jun 2025 00:47)  methylcobalamin 1000 mcg oral tablet, chewable: 1 tab(s) chewed once a day (16 Mar 2024 12:21)  pantoprazole 40 mg oral delayed release tablet: 1 tab(s) orally 2 times a day (19 Mar 2024 13:58)  polyethylene glycol 3350 oral powder for reconstitution: 17 gram(s) orally once a day as needed for  constipation (19 Mar 2024 14:30)  senna leaf extract oral tablet: 2 tab(s) orally once a day (at bedtime) (19 Mar 2024 14:30)  tamsulosin 0.4 mg oral capsule: 1 cap(s) orally once a day (16 Mar 2024 12:21)          HOSPITAL MEDICATIONS  (STANDING):  amLODIPine   Tablet 2.5 milliGRAM(s) Oral daily  aspirin enteric coated 81 milliGRAM(s) Oral daily  atorvastatin 20 milliGRAM(s) Oral at bedtime  chlorhexidine 2% Cloths 1 Application(s) Topical <User Schedule>  pantoprazole    Tablet 40 milliGRAM(s) Oral before breakfast  tamsulosin 0.4 milliGRAM(s) Oral at bedtime    MEDICATIONS  (PRN):  acetaminophen     Tablet .. 650 milliGRAM(s) Oral every 6 hours PRN Temp greater or equal to 38C (100.4F), Mild Pain (1 - 3)  aluminum hydroxide/magnesium hydroxide/simethicone Suspension 30 milliLiter(s) Oral every 4 hours PRN Dyspepsia  meclizine 25 milliGRAM(s) Oral daily PRN for dizziness  melatonin 3 milliGRAM(s) Oral at bedtime PRN Insomnia  ondansetron Injectable 4 milliGRAM(s) IV Push every 8 hours PRN Nausea and/or Vomiting        Allergies  Fish (Urticaria)  No Known Drug Allergies        SOCIAL HISTORY:  Tobacco use:  Denies  Alcohol use:  Denies  Drug use:  Denies        FAMILY HISTORY:  Patient unsure of family history        REVIEW OF SYSTEMS:  CONSTITUTIONAL:  (+) Generalized weakness, No fevers or chills  EYES/ENT:  No visual changes;  No throat pain   NECK:  No pain or stiffness  RESPIRATORY:  No cough, wheezing, hemoptysis; No shortness of breath  CARDIOVASCULAR:  No chest pain or palpitations  GASTROINTESTINAL:  No abdominal or epigastric pain. No nausea, vomiting, or hematemesis; No diarrhea or constipation. No melena or hematochezia.  GENITOURINARY:  No dysuria, frequency or hematuria  MUSCULOSKELETAL:  (+) LLE pain,  FROM all extremities, normal strength, No calf tenderness  NEUROLOGICAL:  (+) Dizziness, (+) Presyncope.  No LOC, No numbness or focal weakness  SKIN:  No itching, rashes          Vital Signs Last 24 Hrs  T(C): 36.9 (07 Jun 2025 05:26), Max: 36.9 (07 Jun 2025 05:26)  T(F): 98.4 (07 Jun 2025 05:26), Max: 98.4 (07 Jun 2025 05:26)  HR: 68 (07 Jun 2025 05:26) (68 - 105)  BP: 155/91 (07 Jun 2025 05:26) (138/88 - 155/91)  RR: 18 (07 Jun 2025 05:26) (18 - 19)  SpO2: 98% (07 Jun 2025 05:26) (96% - 98%)    Parameters below as of 07 Jun 2025 05:26  Patient On (Oxygen Delivery Method): room air            PHYSICAL EXAM:    General:  WD, WN, elderly Mandarin speaking male in NAD.    Skin:  Warm, dry, good color & turgor.    Back:  No spinal tenderness.     Respiratory:  CTA B/L.    Cardiovascular:  S1 & S2, RRR.    Gastrointestinal:  + BS, soft, no distention, non-tender, no rebound or guarding or peritoneal signs.  No Groin pain or palpable mass.     Genitourinary:  No suprapubic tenderness,  No CVAT.     Musculoskeletal:   (+) Lateral lower leg tenderness to palpation.  Free ROM B/L upper and lower extremities, no current C/C/E.  Feet and toes warm with good color,  2+/2+ dorsalis pedis and posterior tibialis pulses.  5+/5+ foot/ankle dorsi and plantar flexion.  No erythema, calf tenderness or palpable cords.  Absent Sage's sign B/L.        Neurological:  No focal deficits.             LABS:                        13.3   7.57  )-----------( 179      ( 07 Jun 2025 08:49 )             40.3     06-07    140  |  103  |  9[L]  ----------------------------<  137[H]  3.3[L]   |  25  |  0.6[L]    Ca    8.8      07 Jun 2025 08:49  Mg     2.4     06-06    TPro  7.1  /  Alb  4.3  /  TBili  0.8  /  DBili  x   /  AST  19  /  ALT  15  /  AlkPhos  94  06-07        Urinalysis Basic - ( 07 Jun 2025 08:49 )    Color: x / Appearance: x / SG: x / pH: x  Gluc: 137 mg/dL / Ketone: x  / Bili: x / Urobili: x   Blood: x / Protein: x / Nitrite: x   Leuk Esterase: x / RBC: x / WBC x   Sq Epi: x / Non Sq Epi: x / Bacteria: x          RADIOLOGY & ADDITIONAL STUDIES    CT Angio Chest Aorta w/wo IV Cont (06.06.25 @ 21:17)   ACC: [id number] EXAM:  CT ANGIO CHEST AORTA WAWIC   ORDERED BY: GERARDO LEO     ACC: [id number] EXAM:  CT ANGIO ABD PELV (W)AW IC   ORDERED BY: GERARDO LEO     PROCEDURE DATE:  06/06/2025        INTERPRETATION:  CLINICAL INFORMATION: Chest pain; dizziness    COMPARISON: CT CHEST 3/16/2024.    CONTRAST/COMPLICATIONS:  IV Contrast: IV contrast documented in unlinked concurrent exam   (accession [id number]), Omnipaque 350 (accession [id number])  95 cc   administered   5 cc discarded  Oral Contrast: NONE.    PROCEDURE:  CT of the Chest, Abdomen and Pelvis was performed.  Sagittal and coronal reformats were performed.    FINDINGS:  CHEST:  LUNGS AND LARGE AIRWAYS: Bibasilar atelectasis. Patent central airways.   Left intrafissural nodule  PLEURA: No pleural effusion.  VESSELS: No thoracic aortic aneurysm or intramural hematoma  HEART: Cardiomegaly with right atrial enlargement. No pericardial   effusion.  MEDIASTINUM AND STU: No lymphadenopathy.  CHEST WALL AND LOWER NECK: Within normal limits.    ABDOMEN AND PELVIS:  LIVER: Stable arterial enhancing hepatic dome lesion, likely hemangioma  BILE DUCTS: Normal caliber.  GALLBLADDER: Within normal limits.  SPLEEN: Within normal limits.  PANCREAS: Within normal limits.  ADRENALS: Within normal limits.  KIDNEYS/URETERS: No renal stones or hydronephrosis. Renal cysts and other   subcentimeter hypodensities, too small to further characterize    BLADDER: Anterior urinary bladder wall diverticulum (sagittal series 605,   image 134)  REPRODUCTIVE ORGANS: Prostatomegaly with intravesicular extension    BOWEL: No bowel obstruction. Appendix is normal.  PERITONEUM/RETROPERITONEUM: Within normal limits.  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 Infrarenal abdominal aortic aneurysm measuring approximately   3.8 cm with eccentric mural thrombus (series 306, image 364), previously   3.3 cm. 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  LYMPH NODES: No lymphadenopathy.  ABDOMINAL WALL: Within normal limits.  BONES: Osteopenia. Partially visualized cervical spinal hardware.   Multilevel thoracolumbar degenerative changes with calcifications of the   anterior longitudinal ligament      IMPRESSION:  CHEST:  No thoracic aortic aneurysm, dissection or intramural hematoma.    ABDOMEN/PELVIS:  No intrarenal abdominal aorta dissection.    Infrarenal abdominal aortic aneurysm measuring 3.8 cm with eccentric   mural thrombus (previously 3.3 cm).    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    --- End of Report ---

## 2025-06-08 LAB
ANION GAP SERPL CALC-SCNC: 9 MMOL/L — SIGNIFICANT CHANGE UP (ref 7–14)
BASOPHILS # BLD AUTO: 0.01 K/UL — SIGNIFICANT CHANGE UP (ref 0–0.2)
BASOPHILS NFR BLD AUTO: 0.2 % — SIGNIFICANT CHANGE UP (ref 0–1)
BUN SERPL-MCNC: 12 MG/DL — SIGNIFICANT CHANGE UP (ref 10–20)
CALCIUM SERPL-MCNC: 9 MG/DL — SIGNIFICANT CHANGE UP (ref 8.4–10.5)
CHLORIDE SERPL-SCNC: 102 MMOL/L — SIGNIFICANT CHANGE UP (ref 98–110)
CO2 SERPL-SCNC: 28 MMOL/L — SIGNIFICANT CHANGE UP (ref 17–32)
CREAT SERPL-MCNC: 0.6 MG/DL — LOW (ref 0.7–1.5)
EGFR: 100 ML/MIN/1.73M2 — SIGNIFICANT CHANGE UP
EGFR: 100 ML/MIN/1.73M2 — SIGNIFICANT CHANGE UP
EOSINOPHIL # BLD AUTO: 0.06 K/UL — SIGNIFICANT CHANGE UP (ref 0–0.7)
EOSINOPHIL NFR BLD AUTO: 1.1 % — SIGNIFICANT CHANGE UP (ref 0–8)
GLUCOSE SERPL-MCNC: 94 MG/DL — SIGNIFICANT CHANGE UP (ref 70–99)
HCT VFR BLD CALC: 39.7 % — LOW (ref 42–52)
HGB BLD-MCNC: 13.2 G/DL — LOW (ref 14–18)
IMM GRANULOCYTES NFR BLD AUTO: 0.4 % — HIGH (ref 0.1–0.3)
LACTATE SERPL-SCNC: 0.9 MMOL/L — SIGNIFICANT CHANGE UP (ref 0.7–2)
LYMPHOCYTES # BLD AUTO: 0.84 K/UL — LOW (ref 1.2–3.4)
LYMPHOCYTES # BLD AUTO: 15.1 % — LOW (ref 20.5–51.1)
MCHC RBC-ENTMCNC: 31.6 PG — HIGH (ref 27–31)
MCHC RBC-ENTMCNC: 33.2 G/DL — SIGNIFICANT CHANGE UP (ref 32–37)
MCV RBC AUTO: 95 FL — HIGH (ref 80–94)
MONOCYTES # BLD AUTO: 0.46 K/UL — SIGNIFICANT CHANGE UP (ref 0.1–0.6)
MONOCYTES NFR BLD AUTO: 8.3 % — SIGNIFICANT CHANGE UP (ref 1.7–9.3)
NEUTROPHILS # BLD AUTO: 4.16 K/UL — SIGNIFICANT CHANGE UP (ref 1.4–6.5)
NEUTROPHILS NFR BLD AUTO: 74.9 % — SIGNIFICANT CHANGE UP (ref 42.2–75.2)
NRBC BLD AUTO-RTO: 0 /100 WBCS — SIGNIFICANT CHANGE UP (ref 0–0)
PLATELET # BLD AUTO: 170 K/UL — SIGNIFICANT CHANGE UP (ref 130–400)
PMV BLD: 9.7 FL — SIGNIFICANT CHANGE UP (ref 7.4–10.4)
POTASSIUM SERPL-MCNC: 4.3 MMOL/L — SIGNIFICANT CHANGE UP (ref 3.5–5)
POTASSIUM SERPL-SCNC: 4.3 MMOL/L — SIGNIFICANT CHANGE UP (ref 3.5–5)
RBC # BLD: 4.18 M/UL — LOW (ref 4.7–6.1)
RBC # FLD: 12.2 % — SIGNIFICANT CHANGE UP (ref 11.5–14.5)
SODIUM SERPL-SCNC: 139 MMOL/L — SIGNIFICANT CHANGE UP (ref 135–146)
TROPONIN T, HIGH SENSITIVITY RESULT: 13 NG/L — SIGNIFICANT CHANGE UP (ref 6–21)
WBC # BLD: 5.55 K/UL — SIGNIFICANT CHANGE UP (ref 4.8–10.8)
WBC # FLD AUTO: 5.55 K/UL — SIGNIFICANT CHANGE UP (ref 4.8–10.8)

## 2025-06-08 PROCEDURE — 99232 SBSQ HOSP IP/OBS MODERATE 35: CPT

## 2025-06-08 RX ORDER — POLYETHYLENE GLYCOL 3350 17 G/17G
17 POWDER, FOR SOLUTION ORAL DAILY
Refills: 0 | Status: DISCONTINUED | OUTPATIENT
Start: 2025-06-08 | End: 2025-06-09

## 2025-06-08 RX ORDER — SENNA 187 MG
2 TABLET ORAL AT BEDTIME
Refills: 0 | Status: DISCONTINUED | OUTPATIENT
Start: 2025-06-08 | End: 2025-06-10

## 2025-06-08 RX ORDER — BISACODYL 5 MG
10 TABLET, DELAYED RELEASE (ENTERIC COATED) ORAL ONCE
Refills: 0 | Status: DISCONTINUED | OUTPATIENT
Start: 2025-06-08 | End: 2025-06-08

## 2025-06-08 RX ADMIN — Medication 2 TABLET(S): at 21:22

## 2025-06-08 RX ADMIN — Medication 40 MILLIGRAM(S): at 05:42

## 2025-06-08 RX ADMIN — SODIUM CHLORIDE 75 MILLILITER(S): 9 INJECTION, SOLUTION INTRAVENOUS at 05:41

## 2025-06-08 RX ADMIN — Medication 81 MILLIGRAM(S): at 11:28

## 2025-06-08 RX ADMIN — POLYETHYLENE GLYCOL 3350 17 GRAM(S): 17 POWDER, FOR SOLUTION ORAL at 11:29

## 2025-06-08 RX ADMIN — ENOXAPARIN SODIUM 40 MILLIGRAM(S): 100 INJECTION SUBCUTANEOUS at 13:54

## 2025-06-08 RX ADMIN — ATORVASTATIN CALCIUM 20 MILLIGRAM(S): 80 TABLET, FILM COATED ORAL at 21:22

## 2025-06-08 RX ADMIN — AMLODIPINE BESYLATE 2.5 MILLIGRAM(S): 10 TABLET ORAL at 05:42

## 2025-06-08 RX ADMIN — Medication 1 APPLICATION(S): at 05:42

## 2025-06-08 RX ADMIN — TAMSULOSIN HYDROCHLORIDE 0.4 MILLIGRAM(S): 0.4 CAPSULE ORAL at 21:22

## 2025-06-08 NOTE — PROGRESS NOTE ADULT - SUBJECTIVE AND OBJECTIVE BOX
SUBJECTIVE:    Patient is a 74y old Male who presents with a chief complaint of   Currently admitted to medicine with the primary diagnosis of Dizziness       Today is hospital day 1d. This morning he is resting comfortably in bed and reports no new issues or overnight events. reports feeling generalized weakness and chest discomfort this morning     daughter Daniella at bedside         PAST MEDICAL & SURGICAL HISTORY  Hypertension    High cholesterol      SOCIAL HISTORY:    ALLERGIES:  fish (Urticaria)  No Known Drug Allergies    MEDICATIONS:  STANDING MEDICATIONS  amLODIPine   Tablet 2.5 milliGRAM(s) Oral daily  aspirin enteric coated 81 milliGRAM(s) Oral daily  atorvastatin 20 milliGRAM(s) Oral at bedtime  chlorhexidine 2% Cloths 1 Application(s) Topical <User Schedule>  enoxaparin Injectable 40 milliGRAM(s) SubCutaneous every 24 hours  lactated ringers. 1000 milliLiter(s) IV Continuous <Continuous>  pantoprazole    Tablet 40 milliGRAM(s) Oral before breakfast  polyethylene glycol 3350 17 Gram(s) Oral daily  tamsulosin 0.4 milliGRAM(s) Oral at bedtime    PRN MEDICATIONS  acetaminophen     Tablet .. 650 milliGRAM(s) Oral every 6 hours PRN  aluminum hydroxide/magnesium hydroxide/simethicone Suspension 30 milliLiter(s) Oral every 4 hours PRN  meclizine 25 milliGRAM(s) Oral daily PRN  melatonin 3 milliGRAM(s) Oral at bedtime PRN  ondansetron Injectable 4 milliGRAM(s) IV Push every 8 hours PRN    VITALS:   T(F): 98  HR: 61  BP: 135/84  RR: 18  SpO2: 97%    LABS:  Negative for smoking/alcohol/drug use.                         13.2   5.55  )-----------( 170      ( 08 Jun 2025 07:23 )             39.7     06-08    139  |  102  |  12  ----------------------------<  94  4.3   |  28  |  0.6[L]    Ca    9.0      08 Jun 2025 07:23  Mg     2.4     06-06    TPro  7.1  /  Alb  4.3  /  TBili  0.8  /  DBili  x   /  AST  19  /  ALT  15  /  AlkPhos  94  06-07      Urinalysis Basic - ( 08 Jun 2025 07:23 )    Color: x / Appearance: x / SG: x / pH: x  Gluc: 94 mg/dL / Ketone: x  / Bili: x / Urobili: x   Blood: x / Protein: x / Nitrite: x   Leuk Esterase: x / RBC: x / WBC x   Sq Epi: x / Non Sq Epi: x / Bacteria: x        Lactate, Blood: 0.9 mmol/L (06-08-25 @ 07:23)          RADIOLOGY:    PHYSICAL EXAM:  GEN: No acute distress  HEENT: normocephalic, atraumatic, aniceteric  LUNGS: Clear to auscultation bilaterally, no rales/wheezing/ rhonchi  HEART: S1/S2 present. RRR, no murmurs  ABD: Soft, non-tender, non-distended. Bowel sounds present  EXT: warm   NEURO: AAOX3, normal affect      ASSESSMENT AND PLAN:    74-year-old male with past medical history HTN, HLD, DM, reflux presents with complaint of dizziness and presyncope.  Reports at 1 PM today he got up from laying down and felt like he would pass out.  States he felt afterwards like he was weak and had chest pain while he was standing up.  Daughter Daniella at bedside states that patient has not felt well since the presyncopal episode.  States that patient reports he did not fully lose consciousness at any time.  Reports he had a headache earlier that is now resolved without intervention.  Denies focal numbness/weakness, abdominal pain, nausea/vomiting/diarrhea, change in bowel/bladder habits.      # Dizziness / Pre- Syncope / Sinus Bradycardia / Chest Discomfort   # Orthostatic (+)   - resolved sp IVF   - CTH negative   - CTA Chest Aorta  -No thoracic aortic aneurysm, dissection or intramural hematoma.   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  #  Infrarenal abdominal aortic aneurysm measuring 3.8 cm with eccentric   mural thrombus (previously 3.3 cm).  - vascular sx team  - did not recommend any intervention  , cw asa/statin home dose   - recommended fu op vasc Dr. Pedro     # H/O HTN -cw amlodipine     # H/O HLD - cw statin     dvt/ gi ppx/diet  dispo: acute- telemetry - poss 48 hrs  family discussion: daughter Daniella at bedside 6/8 - all questions answered and concerns addressed

## 2025-06-08 NOTE — CONSULT NOTE ADULT - NS ATTEND AMEND GEN_ALL_CORE FT
Patient has been having leg pain with ambulation. This has caused his dizziness when he has to walk.  -He also complains of burning chest pain at times. He feels it is due to heartburn.   - Patient has been having leg pain with ambulation. This has caused his dizziness/near syncope when he has to walk.  -He also complains of burning chest pain at times. He feels it is due to heartburn.   -TTE without major structural changes.   -Cannot rule out ischemic cause for chest discomfort.   -NPO after MN for Lexiscan nuclear stress.  -PT eval.   -Continue to monitor on tele.

## 2025-06-08 NOTE — CONSULT NOTE ADULT - ASSESSMENT
74-year-old male with past medical history HTN, HLD, DM, reflux presented to the ED for eval of dizziness and near syncope      Impression:  #Dizziness/Near syncope  #HTN, HLD, DM 74-year-old male with past medical history HTN, HLD, DM, reflux presented to the ED for eval of dizziness and near syncope      Impression:  #Dizziness/Near syncope  #HTN, HLD, DM      Pt currently c/o of constant burping, burning like sensation in the midepigastric area  Trop negative/flat x4  ECG: Sinus, non ischemic  On tele noted to be in sinus, with HR in the 40's at times. HR currently 68 on tele  TTE 3/18/24: EF 66%, normal global LVSF         Plan:  Obtain TTE  Repeat ECG in am  Continuos tele monitoring while admitted  MCOT on discharge  Ischemic workup, likely as outpatient  GI eval

## 2025-06-08 NOTE — PHYSICAL THERAPY INITIAL EVALUATION ADULT - PERTINENT HX OF CURRENT PROBLEM, REHAB EVAL
74-year-old male with past medical history HTN, HLD, DM, reflux presents with complaint of dizziness and presyncope.  Reports at 1 PM today he got up from laying down and felt like he would pass out.  States he felt afterwards like he was weak and had chest pain while he was standing up.  Daughter Daniella at bedside states that patient has not felt well since the presyncopal episode.  States that patient reports he did not fully lose consciousness at any time.  Reports he had a headache earlier that is now resolved without intervention.  Denies focal numbness/weakness, abdominal pain, nausea/vomiting/diarrhea, change in bowel/bladder habits.

## 2025-06-08 NOTE — CONSULT NOTE ADULT - SUBJECTIVE AND OBJECTIVE BOX
HPI:  74-year-old male with past medical history HTN, HLD, DM, reflux presents with complaint of dizziness and presyncope.  Reports at 1 PM today he got up from laying down and felt like he would pass out.  States he felt afterwards like he was weak and had chest pain while he was standing up.  Daughter Daniella at bedside states that patient has not felt well since the presyncopal episode.  States that patient reports he did not fully lose consciousness at any time.  Reports he had a headache earlier that is now resolved without intervention.  Denies focal numbness/weakness, abdominal pain, nausea/vomiting/diarrhea, change in bowel/bladder habits.    Home meds provided by daughter. (06 Jun 2025 23:57)        HPI-Cardiology   Pt with the above Hx and HPI, evaluated at bedside. Pt presented for eval of dizziness and near syncopal episode. Pt........................Radiology tests and hospital records, were reviewed, as well as previous notes on this patient.      PAST MEDICAL & SURGICAL HISTORY  Hypertension    High cholesterol        ALLERGIES:  fish (Urticaria)  No Known Drug Allergies      MEDICATIONS:  MEDICATIONS  (STANDING):  amLODIPine   Tablet 2.5 milliGRAM(s) Oral daily  aspirin enteric coated 81 milliGRAM(s) Oral daily  atorvastatin 20 milliGRAM(s) Oral at bedtime  chlorhexidine 2% Cloths 1 Application(s) Topical <User Schedule>  enoxaparin Injectable 40 milliGRAM(s) SubCutaneous every 24 hours  lactated ringers. 1000 milliLiter(s) (75 mL/Hr) IV Continuous <Continuous>  pantoprazole    Tablet 40 milliGRAM(s) Oral before breakfast  polyethylene glycol 3350 17 Gram(s) Oral daily  tamsulosin 0.4 milliGRAM(s) Oral at bedtime    MEDICATIONS  (PRN):  acetaminophen     Tablet .. 650 milliGRAM(s) Oral every 6 hours PRN Temp greater or equal to 38C (100.4F), Mild Pain (1 - 3)  aluminum hydroxide/magnesium hydroxide/simethicone Suspension 30 milliLiter(s) Oral every 4 hours PRN Dyspepsia  meclizine 25 milliGRAM(s) Oral daily PRN for dizziness  melatonin 3 milliGRAM(s) Oral at bedtime PRN Insomnia  ondansetron Injectable 4 milliGRAM(s) IV Push every 8 hours PRN Nausea and/or Vomiting      HOME MEDICATIONS:  Home Medications:  aluminum hydroxide-magnesium hydroxide 200 mg-200 mg/5 mL oral suspension: 30 milliliter(s) orally every 6 hours as needed for  indigestion (19 Mar 2024 14:30)  amLODIPine 2.5 mg oral tablet: 1 tab(s) orally once a day (16 Mar 2024 12:21)  aspirin 81 mg oral delayed release tablet: 1 tab(s) orally once a day (16 Mar 2024 12:16)  atorvastatin 20 mg oral tablet: 1 tab(s) orally once a day (16 Mar 2024 12:21)  Dexilant 60 mg oral delayed release capsule: 1 cap(s) orally once a day (07 Jun 2025 00:36)  famotidine 40 mg oral tablet: 1 tab(s) orally once a day (16 Mar 2024 12:21)  gabapentin 100 mg oral capsule: 1 cap(s) orally 2 times a day (19 Mar 2024 13:58)  hydrOXYzine pamoate 25 mg oral capsule: 1 cap(s) orally 3 times a day as needed for  allergy symptoms (16 Mar 2024 12:21)  loratadine 10 mg oral tablet: 1 tab(s) orally once a day (16 Mar 2024 12:21)  meclizine 25 mg oral tablet: 1 tab(s) orally once a day as needed for  dizziness (07 Jun 2025 00:47)  methylcobalamin 1000 mcg oral tablet, chewable: 1 tab(s) chewed once a day (16 Mar 2024 12:21)  pantoprazole 40 mg oral delayed release tablet: 1 tab(s) orally 2 times a day (19 Mar 2024 13:58)  polyethylene glycol 3350 oral powder for reconstitution: 17 gram(s) orally once a day as needed for  constipation (19 Mar 2024 14:30)  senna leaf extract oral tablet: 2 tab(s) orally once a day (at bedtime) (19 Mar 2024 14:30)  tamsulosin 0.4 mg oral capsule: 1 cap(s) orally once a day (16 Mar 2024 12:21)      VITALS:   T(F): 98.1 (06-08 @ 14:00), Max: 98.4 (06-07 @ 05:26)  HR: 71 (06-08 @ 14:00) (61 - 105)  BP: 115/80 (06-08 @ 14:00) (115/80 - 155/91)  BP(mean): --  RR: 18 (06-08 @ 14:00) (18 - 19)  SpO2: 97% (06-08 @ 14:00) (95% - 98%)    I&O's Summary    08 Jun 2025 07:01  -  08 Jun 2025 14:34  --------------------------------------------------------  IN: 240 mL / OUT: 0 mL / NET: 240 mL        REVIEW OF SYSTEMS:  See HPI        PHYSICAL EXAM:  NEURO: patient is awake , alert and oriented  GEN: Not in acute distress  NECK: no thyroid enlargement, no JVD  LUNGS: Clear to auscultation bilaterally   CARDIOVASCULAR: S1/S2 present, RRR , no murmurs or rubs, no carotid bruits,  + PP bilaterally  ABD: Soft, non-tender, non-distended, +BS  EXT: No FRANKLIN  SKIN: Intact    LABS:                        13.2   5.55  )-----------( 170      ( 08 Jun 2025 07:23 )             39.7     06-08    139  |  102  |  12  ----------------------------<  94  4.3   |  28  |  0.6[L]    Ca    9.0      08 Jun 2025 07:23  Mg     2.4     06-06    TPro  7.1  /  Alb  4.3  /  TBili  0.8  /  DBili  x   /  AST  19  /  ALT  15  /  AlkPhos  94  06-07      Lactate, Blood: 0.9 mmol/L (06-08-25 @ 07:23)      RADIOLOGY:  -CXR:  < from: Xray Chest 1 View- PORTABLE-Urgent (06.06.25 @ 22:36) >    IMPRESSION:    No radiographic evidence of acute cardiopulmonary disease.    --- End of Report ---    < end of copied text >            < from: CT Angio Chest Aorta w/wo IV Cont (06.06.25 @ 21:17) >  IMPRESSION:  CHEST:  No thoracic aortic aneurysm, dissection or intramural hematoma.    ABDOMEN/PELVIS:  No intrarenal abdominal aorta dissection.    Infrarenal abdominal aortic aneurysm measuring 3.8 cm with eccentric   mural thrombus (previously 3.3 cm).    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    --- End of Report ---      < end of copied text >      -TTE:  < from: TTE Echo Complete w/o Contrast w/ Doppler (03.18.24 @ 08:35) >    Summary:   1. Normal global left ventricular systolic function.   2. LV Ejection Fraction bySimpson's Method with a biplane EF of 66 %.   3. Increased LV wall thickness.   4. Normal right ventricular size and function.   5. Normal left atrial size.   6. Normal right atrial size.   7. Mild aortic regurgitation.   8. Trace mitral valve regurgitation.   9. Dilatation of the aortic root, measures 3.8 cm.  10. Normal pulmonary artery pressure.  11. There is no evidence of pericardial effusion.      < end of copied text >        ECG:  < from: 12 Lead ECG (06.07.25 @ 07:45) >  Normal sinus rhythm  Minimal voltage criteria for LVH, may be normal variant ( Sokolow-Beltrán )    Abnormal ECG        Confirmed by ZINA CAMPOS MD ([id number]) on 6/7/2025 8:47:28 AM    < end of copied text >     HPI:  74-year-old male with past medical history HTN, HLD, DM, reflux presents with complaint of dizziness and presyncope.  Reports at 1 PM today he got up from laying down and felt like he would pass out.  States he felt afterwards like he was weak and had chest pain while he was standing up.  Daughter Daniella at bedside states that patient has not felt well since the presyncopal episode.  States that patient reports he did not fully lose consciousness at any time.  Reports he had a headache earlier that is now resolved without intervention.  Denies focal numbness/weakness, abdominal pain, nausea/vomiting/diarrhea, change in bowel/bladder habits.    Home meds provided by daughter. (06 Jun 2025 23:57)        HPI-Cardiology   Pt with the above Hx and HPI, evaluated at bedside. Pt presented for eval of dizziness and near syncopal episode. Pt is mandarin speaking and interpretation done by Pts's daughter. Pt states for the past year he has been getting dizzy, when sitting or standing form a lying position. Denies LOC or head trauma, and also endorses nonstop burping associated with burning sensation in the mid epigastric area and inability to move his bowels for the past 3-4 days. Denies CP, SOB or any other cardiac related symptoms. Radiology tests and hospital records, were reviewed, as well as previous notes on this patient.      PAST MEDICAL & SURGICAL HISTORY  Hypertension    High cholesterol        ALLERGIES:  fish (Urticaria)  No Known Drug Allergies      MEDICATIONS:  MEDICATIONS  (STANDING):  amLODIPine   Tablet 2.5 milliGRAM(s) Oral daily  aspirin enteric coated 81 milliGRAM(s) Oral daily  atorvastatin 20 milliGRAM(s) Oral at bedtime  chlorhexidine 2% Cloths 1 Application(s) Topical <User Schedule>  enoxaparin Injectable 40 milliGRAM(s) SubCutaneous every 24 hours  lactated ringers. 1000 milliLiter(s) (75 mL/Hr) IV Continuous <Continuous>  pantoprazole    Tablet 40 milliGRAM(s) Oral before breakfast  polyethylene glycol 3350 17 Gram(s) Oral daily  tamsulosin 0.4 milliGRAM(s) Oral at bedtime    MEDICATIONS  (PRN):  acetaminophen     Tablet .. 650 milliGRAM(s) Oral every 6 hours PRN Temp greater or equal to 38C (100.4F), Mild Pain (1 - 3)  aluminum hydroxide/magnesium hydroxide/simethicone Suspension 30 milliLiter(s) Oral every 4 hours PRN Dyspepsia  meclizine 25 milliGRAM(s) Oral daily PRN for dizziness  melatonin 3 milliGRAM(s) Oral at bedtime PRN Insomnia  ondansetron Injectable 4 milliGRAM(s) IV Push every 8 hours PRN Nausea and/or Vomiting      HOME MEDICATIONS:  Home Medications:  aluminum hydroxide-magnesium hydroxide 200 mg-200 mg/5 mL oral suspension: 30 milliliter(s) orally every 6 hours as needed for  indigestion (19 Mar 2024 14:30)  amLODIPine 2.5 mg oral tablet: 1 tab(s) orally once a day (16 Mar 2024 12:21)  aspirin 81 mg oral delayed release tablet: 1 tab(s) orally once a day (16 Mar 2024 12:16)  atorvastatin 20 mg oral tablet: 1 tab(s) orally once a day (16 Mar 2024 12:21)  Dexilant 60 mg oral delayed release capsule: 1 cap(s) orally once a day (07 Jun 2025 00:36)  famotidine 40 mg oral tablet: 1 tab(s) orally once a day (16 Mar 2024 12:21)  gabapentin 100 mg oral capsule: 1 cap(s) orally 2 times a day (19 Mar 2024 13:58)  hydrOXYzine pamoate 25 mg oral capsule: 1 cap(s) orally 3 times a day as needed for  allergy symptoms (16 Mar 2024 12:21)  loratadine 10 mg oral tablet: 1 tab(s) orally once a day (16 Mar 2024 12:21)  meclizine 25 mg oral tablet: 1 tab(s) orally once a day as needed for  dizziness (07 Jun 2025 00:47)  methylcobalamin 1000 mcg oral tablet, chewable: 1 tab(s) chewed once a day (16 Mar 2024 12:21)  pantoprazole 40 mg oral delayed release tablet: 1 tab(s) orally 2 times a day (19 Mar 2024 13:58)  polyethylene glycol 3350 oral powder for reconstitution: 17 gram(s) orally once a day as needed for  constipation (19 Mar 2024 14:30)  senna leaf extract oral tablet: 2 tab(s) orally once a day (at bedtime) (19 Mar 2024 14:30)  tamsulosin 0.4 mg oral capsule: 1 cap(s) orally once a day (16 Mar 2024 12:21)      VITALS:   T(F): 98.1 (06-08 @ 14:00), Max: 98.4 (06-07 @ 05:26)  HR: 71 (06-08 @ 14:00) (61 - 105)  BP: 115/80 (06-08 @ 14:00) (115/80 - 155/91)  BP(mean): --  RR: 18 (06-08 @ 14:00) (18 - 19)  SpO2: 97% (06-08 @ 14:00) (95% - 98%)    I&O's Summary    08 Jun 2025 07:01  -  08 Jun 2025 14:34  --------------------------------------------------------  IN: 240 mL / OUT: 0 mL / NET: 240 mL        REVIEW OF SYSTEMS:  See HPI        PHYSICAL EXAM:  NEURO: patient is awake , alert and oriented  GEN: Not in acute distress  NECK: no thyroid enlargement, no JVD  LUNGS: Clear to auscultation bilaterally   CARDIOVASCULAR: S1/S2 present, RRR , no murmurs or rubs, no carotid bruits,  + PP bilaterally  ABD: Soft, non-tender, non-distended, +BS  EXT: No FRANKLIN  SKIN: Intact    LABS:                        13.2   5.55  )-----------( 170      ( 08 Jun 2025 07:23 )             39.7     06-08    139  |  102  |  12  ----------------------------<  94  4.3   |  28  |  0.6[L]    Ca    9.0      08 Jun 2025 07:23  Mg     2.4     06-06    TPro  7.1  /  Alb  4.3  /  TBili  0.8  /  DBili  x   /  AST  19  /  ALT  15  /  AlkPhos  94  06-07      Lactate, Blood: 0.9 mmol/L (06-08-25 @ 07:23)      RADIOLOGY:  -CXR:  < from: Xray Chest 1 View- PORTABLE-Urgent (06.06.25 @ 22:36) >    IMPRESSION:    No radiographic evidence of acute cardiopulmonary disease.    --- End of Report ---    < end of copied text >            < from: CT Angio Chest Aorta w/wo IV Cont (06.06.25 @ 21:17) >  IMPRESSION:  CHEST:  No thoracic aortic aneurysm, dissection or intramural hematoma.    ABDOMEN/PELVIS:  No intrarenal abdominal aorta dissection.    Infrarenal abdominal aortic aneurysm measuring 3.8 cm with eccentric   mural thrombus (previously 3.3 cm).    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    --- End of Report ---      < end of copied text >      -TTE:  < from: TTE Echo Complete w/o Contrast w/ Doppler (03.18.24 @ 08:35) >    Summary:   1. Normal global left ventricular systolic function.   2. LV Ejection Fraction bySimpson's Method with a biplane EF of 66 %.   3. Increased LV wall thickness.   4. Normal right ventricular size and function.   5. Normal left atrial size.   6. Normal right atrial size.   7. Mild aortic regurgitation.   8. Trace mitral valve regurgitation.   9. Dilatation of the aortic root, measures 3.8 cm.  10. Normal pulmonary artery pressure.  11. There is no evidence of pericardial effusion.      < end of copied text >        ECG:  < from: 12 Lead ECG (06.07.25 @ 07:45) >  Normal sinus rhythm  Minimal voltage criteria for LVH, may be normal variant ( Sokolow-Beltrán )    Abnormal ECG        Confirmed by ZINA CAMPOS MD ([id number]) on 6/7/2025 8:47:28 AM    < end of copied text >

## 2025-06-08 NOTE — PHYSICAL THERAPY INITIAL EVALUATION ADULT - ADDITIONAL COMMENTS
pt lives with his wife in a private house with 10 steps to enter with rails and one level inside.  Pt was able to amb with RW prior to admission

## 2025-06-08 NOTE — PHYSICAL THERAPY INITIAL EVALUATION ADULT - GENERAL OBSERVATIONS, REHAB EVAL
10;05-10;30 pt was seen for PT IE at bed side, pt is agreeable, chart thoroughly reviewed, RN Erik is aware.  Pt received and left semi arteaga in bed, in no apparent distress, +IV, +call bell within reach, bed side table at reach, wife is at bed side
no pain, swelling or deformity of joints

## 2025-06-09 ENCOUNTER — TRANSCRIPTION ENCOUNTER (OUTPATIENT)
Age: 75
End: 2025-06-09

## 2025-06-09 ENCOUNTER — RESULT REVIEW (OUTPATIENT)
Age: 75
End: 2025-06-09

## 2025-06-09 DIAGNOSIS — R93.89 ABNORMAL FINDINGS ON DIAGNOSTIC IMAGING OF OTHER SPECIFIED BODY STRUCTURES: ICD-10-CM

## 2025-06-09 LAB
ANION GAP SERPL CALC-SCNC: 12 MMOL/L — SIGNIFICANT CHANGE UP (ref 7–14)
BASOPHILS # BLD AUTO: 0.01 K/UL — SIGNIFICANT CHANGE UP (ref 0–0.2)
BASOPHILS NFR BLD AUTO: 0.2 % — SIGNIFICANT CHANGE UP (ref 0–1)
BUN SERPL-MCNC: 9 MG/DL — LOW (ref 10–20)
CALCIUM SERPL-MCNC: 8.8 MG/DL — SIGNIFICANT CHANGE UP (ref 8.4–10.5)
CHLORIDE SERPL-SCNC: 100 MMOL/L — SIGNIFICANT CHANGE UP (ref 98–110)
CO2 SERPL-SCNC: 25 MMOL/L — SIGNIFICANT CHANGE UP (ref 17–32)
CREAT SERPL-MCNC: <0.5 MG/DL — LOW (ref 0.7–1.5)
EGFR: 113 ML/MIN/1.73M2 — SIGNIFICANT CHANGE UP
EGFR: 113 ML/MIN/1.73M2 — SIGNIFICANT CHANGE UP
EOSINOPHIL # BLD AUTO: 0.05 K/UL — SIGNIFICANT CHANGE UP (ref 0–0.7)
EOSINOPHIL NFR BLD AUTO: 0.9 % — SIGNIFICANT CHANGE UP (ref 0–8)
GLUCOSE SERPL-MCNC: 87 MG/DL — SIGNIFICANT CHANGE UP (ref 70–99)
HCT VFR BLD CALC: 36 % — LOW (ref 42–52)
HGB BLD-MCNC: 12 G/DL — LOW (ref 14–18)
IMM GRANULOCYTES NFR BLD AUTO: 0.2 % — SIGNIFICANT CHANGE UP (ref 0.1–0.3)
LYMPHOCYTES # BLD AUTO: 1.02 K/UL — LOW (ref 1.2–3.4)
LYMPHOCYTES # BLD AUTO: 19.2 % — LOW (ref 20.5–51.1)
MCHC RBC-ENTMCNC: 31.3 PG — HIGH (ref 27–31)
MCHC RBC-ENTMCNC: 33.3 G/DL — SIGNIFICANT CHANGE UP (ref 32–37)
MCV RBC AUTO: 93.8 FL — SIGNIFICANT CHANGE UP (ref 80–94)
MONOCYTES # BLD AUTO: 0.39 K/UL — SIGNIFICANT CHANGE UP (ref 0.1–0.6)
MONOCYTES NFR BLD AUTO: 7.4 % — SIGNIFICANT CHANGE UP (ref 1.7–9.3)
NEUTROPHILS # BLD AUTO: 3.82 K/UL — SIGNIFICANT CHANGE UP (ref 1.4–6.5)
NEUTROPHILS NFR BLD AUTO: 72.1 % — SIGNIFICANT CHANGE UP (ref 42.2–75.2)
NRBC BLD AUTO-RTO: 0 /100 WBCS — SIGNIFICANT CHANGE UP (ref 0–0)
PLATELET # BLD AUTO: 161 K/UL — SIGNIFICANT CHANGE UP (ref 130–400)
PMV BLD: 9.6 FL — SIGNIFICANT CHANGE UP (ref 7.4–10.4)
POTASSIUM SERPL-MCNC: 4 MMOL/L — SIGNIFICANT CHANGE UP (ref 3.5–5)
POTASSIUM SERPL-SCNC: 4 MMOL/L — SIGNIFICANT CHANGE UP (ref 3.5–5)
RBC # BLD: 3.84 M/UL — LOW (ref 4.7–6.1)
RBC # FLD: 12.1 % — SIGNIFICANT CHANGE UP (ref 11.5–14.5)
SODIUM SERPL-SCNC: 137 MMOL/L — SIGNIFICANT CHANGE UP (ref 135–146)
WBC # BLD: 5.3 K/UL — SIGNIFICANT CHANGE UP (ref 4.8–10.8)
WBC # FLD AUTO: 5.3 K/UL — SIGNIFICANT CHANGE UP (ref 4.8–10.8)

## 2025-06-09 PROCEDURE — 99223 1ST HOSP IP/OBS HIGH 75: CPT

## 2025-06-09 PROCEDURE — 99232 SBSQ HOSP IP/OBS MODERATE 35: CPT

## 2025-06-09 PROCEDURE — 93306 TTE W/DOPPLER COMPLETE: CPT | Mod: 26

## 2025-06-09 PROCEDURE — 93010 ELECTROCARDIOGRAM REPORT: CPT

## 2025-06-09 RX ORDER — REGADENOSON 0.08 MG/ML
0.4 INJECTION, SOLUTION INTRAVENOUS ONCE
Refills: 0 | Status: DISCONTINUED | OUTPATIENT
Start: 2025-06-09 | End: 2025-06-10

## 2025-06-09 RX ORDER — POLYETHYLENE GLYCOL 3350 17 G/17G
17 POWDER, FOR SOLUTION ORAL
Refills: 0 | Status: DISCONTINUED | OUTPATIENT
Start: 2025-06-09 | End: 2025-06-10

## 2025-06-09 RX ADMIN — Medication 650 MILLIGRAM(S): at 15:06

## 2025-06-09 RX ADMIN — TAMSULOSIN HYDROCHLORIDE 0.4 MILLIGRAM(S): 0.4 CAPSULE ORAL at 21:12

## 2025-06-09 RX ADMIN — ATORVASTATIN CALCIUM 20 MILLIGRAM(S): 80 TABLET, FILM COATED ORAL at 21:12

## 2025-06-09 RX ADMIN — Medication 650 MILLIGRAM(S): at 14:16

## 2025-06-09 RX ADMIN — Medication 2 TABLET(S): at 21:12

## 2025-06-09 RX ADMIN — AMLODIPINE BESYLATE 2.5 MILLIGRAM(S): 10 TABLET ORAL at 05:22

## 2025-06-09 RX ADMIN — Medication 1 APPLICATION(S): at 05:22

## 2025-06-09 RX ADMIN — Medication 40 MILLIGRAM(S): at 05:22

## 2025-06-09 RX ADMIN — SODIUM CHLORIDE 75 MILLILITER(S): 9 INJECTION, SOLUTION INTRAVENOUS at 21:12

## 2025-06-09 RX ADMIN — ENOXAPARIN SODIUM 40 MILLIGRAM(S): 100 INJECTION SUBCUTANEOUS at 14:19

## 2025-06-09 RX ADMIN — POLYETHYLENE GLYCOL 3350 17 GRAM(S): 17 POWDER, FOR SOLUTION ORAL at 12:20

## 2025-06-09 RX ADMIN — Medication 81 MILLIGRAM(S): at 12:20

## 2025-06-09 NOTE — PROGRESS NOTE ADULT - SUBJECTIVE AND OBJECTIVE BOX
SUBJECTIVE:    Patient is a 74y old Male who presents with a chief complaint of   Currently admitted to medicine with the primary diagnosis of Dizziness       Today is hospital day 2d. This morning he is resting comfortably in bed and reports no new issues or overnight events.      # used [id number]        PAST MEDICAL & SURGICAL HISTORY  Hypertension    High cholesterol      SOCIAL HISTORY:    ALLERGIES:  fish (Urticaria)  No Known Drug Allergies    MEDICATIONS:  STANDING MEDICATIONS  amLODIPine   Tablet 2.5 milliGRAM(s) Oral daily  aspirin enteric coated 81 milliGRAM(s) Oral daily  atorvastatin 20 milliGRAM(s) Oral at bedtime  chlorhexidine 2% Cloths 1 Application(s) Topical <User Schedule>  enoxaparin Injectable 40 milliGRAM(s) SubCutaneous every 24 hours  lactated ringers. 1000 milliLiter(s) IV Continuous <Continuous>  pantoprazole    Tablet 40 milliGRAM(s) Oral before breakfast  polyethylene glycol 3350 17 Gram(s) Oral daily  senna 2 Tablet(s) Oral at bedtime  tamsulosin 0.4 milliGRAM(s) Oral at bedtime    PRN MEDICATIONS  acetaminophen     Tablet .. 650 milliGRAM(s) Oral every 6 hours PRN  aluminum hydroxide/magnesium hydroxide/simethicone Suspension 30 milliLiter(s) Oral every 4 hours PRN  meclizine 25 milliGRAM(s) Oral daily PRN  melatonin 3 milliGRAM(s) Oral at bedtime PRN  ondansetron Injectable 4 milliGRAM(s) IV Push every 8 hours PRN    VITALS:   T(F): 98  HR: 65  BP: 138/87  RR: 18  SpO2: 98%    LABS:  Negative for smoking/alcohol/drug use.                         12.0   5.30  )-----------( 161      ( 09 Jun 2025 08:12 )             36.0     06-09    137  |  100  |  9[L]  ----------------------------<  87  4.0   |  25  |  <0.5[L]    Ca    8.8      09 Jun 2025 08:12        Urinalysis Basic - ( 09 Jun 2025 08:12 )    Color: x / Appearance: x / SG: x / pH: x  Gluc: 87 mg/dL / Ketone: x  / Bili: x / Urobili: x   Blood: x / Protein: x / Nitrite: x   Leuk Esterase: x / RBC: x / WBC x   Sq Epi: x / Non Sq Epi: x / Bacteria: x                RADIOLOGY:    PHYSICAL EXAM:  GEN: No acute distress  HEENT: normocephalic, atraumatic, aniceteric  LUNGS: bl breath sounds   HEART: S1/S2 present. RRR, no murmurs  ABD: Soft, non-tender, non-distended. Bowel sounds present  EXT: warm   NEURO: AAOX3, normal affect      ASSESSMENT AND PLAN:    74-year-old male with past medical history HTN, HLD, DM, reflux presents with complaint of dizziness and presyncope.  Reports at 1 PM today he got up from laying down and felt like he would pass out.  States he felt afterwards like he was weak and had chest pain while he was standing up.  Daughter Daniella at bedside states that patient has not felt well since the presyncopal episode.  States that patient reports he did not fully lose consciousness at any time.  Reports he had a headache earlier that is now resolved without intervention.  Denies focal numbness/weakness, abdominal pain, nausea/vomiting/diarrhea, change in bowel/bladder habits.      # Dizziness / Pre- Syncope / Sinus Bradycardia   # Orthostatic (+)   - resolved sp IVF   - CTH negative   - CTA Chest Aorta  -No thoracic aortic aneurysm, dissection or intramural hematoma.  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  #  Infrarenal abdominal aortic aneurysm measuring 3.8 cm with eccentric   mural thrombus (previously 3.3 cm).  - vascular sx team  - did not recommend any intervention  , cw asa/statin home dose   - recommended fu op vasc Dr. Pedro     # H/O HTN - cw amlodipine     # H/O HLD - cw statin     dvt/ gi ppx/diet  dispo: acute - pending stress echo tomorrow  - poss dc in 24 hrs if negative cardiac w/up  SUBJECTIVE:    Patient is a 74y old Male who presents with a chief complaint of   Currently admitted to medicine with the primary diagnosis of Dizziness       Today is hospital day 2d. This morning he is resting comfortably in bed and reports no new issues or overnight events.      # used [id number]        PAST MEDICAL & SURGICAL HISTORY  Hypertension    High cholesterol      SOCIAL HISTORY:    ALLERGIES:  fish (Urticaria)  No Known Drug Allergies    MEDICATIONS:  STANDING MEDICATIONS  amLODIPine   Tablet 2.5 milliGRAM(s) Oral daily  aspirin enteric coated 81 milliGRAM(s) Oral daily  atorvastatin 20 milliGRAM(s) Oral at bedtime  chlorhexidine 2% Cloths 1 Application(s) Topical <User Schedule>  enoxaparin Injectable 40 milliGRAM(s) SubCutaneous every 24 hours  lactated ringers. 1000 milliLiter(s) IV Continuous <Continuous>  pantoprazole    Tablet 40 milliGRAM(s) Oral before breakfast  polyethylene glycol 3350 17 Gram(s) Oral daily  senna 2 Tablet(s) Oral at bedtime  tamsulosin 0.4 milliGRAM(s) Oral at bedtime    PRN MEDICATIONS  acetaminophen     Tablet .. 650 milliGRAM(s) Oral every 6 hours PRN  aluminum hydroxide/magnesium hydroxide/simethicone Suspension 30 milliLiter(s) Oral every 4 hours PRN  meclizine 25 milliGRAM(s) Oral daily PRN  melatonin 3 milliGRAM(s) Oral at bedtime PRN  ondansetron Injectable 4 milliGRAM(s) IV Push every 8 hours PRN    VITALS:   T(F): 98  HR: 65  BP: 138/87  RR: 18  SpO2: 98%    LABS:  Negative for smoking/alcohol/drug use.                         12.0   5.30  )-----------( 161      ( 09 Jun 2025 08:12 )             36.0     06-09    137  |  100  |  9[L]  ----------------------------<  87  4.0   |  25  |  <0.5[L]    Ca    8.8      09 Jun 2025 08:12        Urinalysis Basic - ( 09 Jun 2025 08:12 )    Color: x / Appearance: x / SG: x / pH: x  Gluc: 87 mg/dL / Ketone: x  / Bili: x / Urobili: x   Blood: x / Protein: x / Nitrite: x   Leuk Esterase: x / RBC: x / WBC x   Sq Epi: x / Non Sq Epi: x / Bacteria: x                RADIOLOGY:    PHYSICAL EXAM:  GEN: No acute distress  HEENT: normocephalic, atraumatic, aniceteric  LUNGS: bl breath sounds   HEART: S1/S2 present. RRR, no murmurs  ABD: Soft, non-tender, non-distended. Bowel sounds present  EXT: warm   NEURO: AAOX3, normal affect      ASSESSMENT AND PLAN:    74-year-old male with past medical history HTN, HLD, DM, reflux presents with complaint of dizziness and presyncope.  Reports at 1 PM today he got up from laying down and felt like he would pass out.  States he felt afterwards like he was weak and had chest pain while he was standing up.  Daughter Daniella at bedside states that patient has not felt well since the presyncopal episode.  States that patient reports he did not fully lose consciousness at any time.  Reports he had a headache earlier that is now resolved without intervention.  Denies focal numbness/weakness, abdominal pain, nausea/vomiting/diarrhea, change in bowel/bladder habits.      # Dizziness / Pre- Syncope / Sinus Bradycardia   # Orthostatic (+)   - resolved sp IVF   - CTH negative   - CTA Chest Aorta  -No thoracic aortic aneurysm, dissection or intramural hematoma.  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  #  Infrarenal abdominal aortic aneurysm measuring 3.8 cm with eccentric   mural thrombus (previously 3.3 cm).  - vascular sx team  - did not recommend any intervention  , cw asa/statin home dose   - recommended fu op vasc Dr. Pedro     # H/O HTN - cw amlodipine     # H/O HLD - cw statin     dvt/ gi ppx/diet  dispo: acute - pending stress echo tomorrow  - poss dc in 24 hrs if negative cardiac w/up

## 2025-06-09 NOTE — DISCHARGE NOTE PROVIDER - HOSPITAL COURSE
74-year-old male with past medical history HTN, HLD, DM, reflux presents with complaint of dizziness and presyncope.  Reports at 1 PM today he got up from laying down and felt like he would pass out.  States he felt afterwards like he was weak and had chest pain while he was standing up.  Daughter Daniella at bedside states that patient has not felt well since the presyncopal episode.  States that patient reports he did not fully lose consciousness at any time.  Reports he had a headache earlier that is now resolved without intervention.  Denies focal numbness/weakness, abdominal pain, nausea/vomiting/diarrhea, change in bowel/bladder habits.      # Dizziness / Pre- Syncope / Sinus Bradycardia   # Orthostatic (+)   - resolved sp IVF   - CTH negative   - CTA Chest Aorta  -No thoracic aortic aneurysm, dissection or intramural hematoma.  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  #  Infrarenal abdominal aortic aneurysm measuring 3.8 cm with eccentric   mural thrombus (previously 3.3 cm).  - vascular sx team  - did not recommend any intervention  , cw asa/statin home dose   - recommended fu op vasc Dr. Pedro     # H/O HTN - cw amlodipine     # H/O HLD - cw statin     attending attestation:  patient seen and examined on day of discharge 6/9  labs and vitals reviewed  patient has no complaints  plan of care discussed with patient/family in agreement and understanding     # used [id number]   74-year-old male with past medical history HTN, HLD, DM, reflux presents with complaint of dizziness and presyncope.  Reports at 1 PM today he got up from laying down and felt like he would pass out.  States he felt afterwards like he was weak and had chest pain while he was standing up.  Daughter Daniella at bedside states that patient has not felt well since the presyncopal episode.  States that patient reports he did not fully lose consciousness at any time.  Reports he had a headache earlier that is now resolved without intervention.  Denies focal numbness/weakness, abdominal pain, nausea/vomiting/diarrhea, change in bowel/bladder habits.      # Dizziness / Pre- Syncope / Sinus Bradycardia   # Orthostatic (+)   - resolved sp IVF   - CTH negative   - CTA Chest Aorta  -No thoracic aortic aneurysm, dissection or intramural hematoma.  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  #  Infrarenal abdominal aortic aneurysm measuring 3.8 cm with eccentric   mural thrombus (previously 3.3 cm).  - vascular sx team  - did not recommend any intervention  , cw asa/statin home dose   - recommended fu op vasc Dr. Pedro     # H/O HTN - cw amlodipine     # H/O HLD - cw statin     attending attestation:  patient seen and examined on day of discharge 6/10  labs and vitals reviewed  patient has no complaints  plan of care discussed with patient/family wife and daughter Daniella at bedside in agreement and understanding       74-year-old male with past medical history HTN, HLD, DM, reflux presents with complaint of dizziness and presyncope.  Reports at 1 PM today he got up from laying down and felt like he would pass out.  States he felt afterwards like he was weak and had chest pain while he was standing up.  Daughter Daniella at bedside states that patient has not felt well since the presyncopal episode.  States that patient reports he did not fully lose consciousness at any time.  Reports he had a headache earlier that is now resolved without intervention.  Denies focal numbness/weakness, abdominal pain, nausea/vomiting/diarrhea, change in bowel/bladder habits.      # Dizziness / Pre- Syncope / Sinus Bradycardia   # Orthostatic (+)   - resolved sp IVF   - CTH negative   - CTA Chest Aorta  -No thoracic aortic aneurysm, dissection or intramural hematoma.  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  #  Infrarenal abdominal aortic aneurysm measuring 3.8 cm with eccentric   mural thrombus (previously 3.3 cm).  - vascular sx team  - did not recommend any intervention  , cw asa/statin home dose   - recommended fu op vasc Dr. Pedro     # H/O HTN - cw amlodipine     # H/O HLD - cw statin     attending attestation:  patient seen and examined on day of discharge 6/10  labs and vitals reviewed  patient has no complaints  plan of care discussed with patient/family wife and daughter Daniella at bedside in agreement and understanding       74-year-old male with past medical history HTN, HLD, DM, reflux presents with complaint of dizziness and presyncope.  Reports at 1 PM today he got up from laying down and felt like he would pass out.  States he felt afterwards like he was weak and had chest pain while he was standing up.  Daughter Daniella at bedside states that patient has not felt well since the presyncopal episode.  States that patient reports he did not fully lose consciousness at any time.  Reports he had a headache earlier that is now resolved without intervention.  Denies focal numbness/weakness, abdominal pain, nausea/vomiting/diarrhea, change in bowel/bladder habits.      # Dizziness / Pre- Syncope / Sinus Bradycardia   # Orthostatic (+)   - resolved sp IVF   - CTH negative   - CTA Chest Aorta  -No thoracic aortic aneurysm, dissection or intramural hematoma.  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  #  Infrarenal abdominal aortic aneurysm measuring 3.8 cm with eccentric   mural thrombus (previously 3.3 cm).  - vascular sx team  - did not recommend any intervention  , cw asa/statin home dose   - recommended fu op vasc Dr. Pedro     # H/O HTN - cw amlodipine     # H/O HLD - cw statin     attending attestation:  patient seen and examined on day of discharge 6/10  labs and vitals reviewed  patient has no complaints  plan of care discussed with patient/family wife and daughter Daniella at bedside in agreement and understanding

## 2025-06-09 NOTE — CONSULT NOTE ADULT - ASSESSMENT
74yMale [facility] HTN, high cholesterol, DM presents for chest pain. GI consulted for epigastric discomfort, patient reports it has resolved now. Patient denies nausea, vomiting, hematemesis, melena, blood in stool, diarrhea, constipation, abdominal pain. Patient reports EGD/Colonoscopy last year at [facility].    #Constipation  patient passing flatus  Rec  -consider Miralax BID    #Epigastric discomfort--->resolved  Rec  -PPI PPX  no acute GI intervention  -followup with patient's private GI or Follow up with our [facility] located at [address] [place] [address]. Phone Number: [phone]     #anemia no gross GI bleeding  Rec  -Follow up with our [facility] located at [address] [place] [address]. Phone Number: [phone] for outpatient GI workup      #Infrarenal abdominal aortic aneurysm measuring 3.8 cm with eccentric   mural thrombus (previously 3.3 cm).  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  Rec  - Care as per primary team     Recall GI if needed

## 2025-06-09 NOTE — DISCHARGE NOTE PROVIDER - CARE PROVIDER_API CALL
Cisco Pedro  Vascular Surgery  [address], [address]  [place] [address]  Phone: [phone]  Fax: [phone]  Follow Up Time:     Fredo Dozier  Gastroenterology  [address] [place] [address]  Phone: [phone]  Fax: [phone]  Follow Up Time:     Malcolm Cortez  Internal Medicine  [phone] [place] [address]  Phone: [phone]  Fax: [phone])-  Follow Up Time:    Cisco Pedro  Vascular Surgery  [address] [facility] [facility], [address]  [place] [address]  Phone: [phone]  Fax: [phone]  Follow Up Time:     Fredo Dozier  Gastroenterology  [address] [place] [address]  Phone: [phone]  Fax: [phone]  Follow Up Time:     Malcolm Cortez  Internal Medicine  [phone] [place] [address]  Phone: [phone]  Fax: [phone])-  Follow Up Time:     Álvaro Rouse  Interventional Cardiology  [address] [place] [address]  Phone: [phone]  Fax: [phone]  Follow Up Time:

## 2025-06-09 NOTE — DISCHARGE NOTE PROVIDER - NSDCMRMEDTOKEN_GEN_ALL_CORE_FT
aluminum hydroxide-magnesium hydroxide 200 mg-200 mg/5 mL oral suspension: 30 milliliter(s) orally every 6 hours as needed for  indigestion  amLODIPine 2.5 mg oral tablet: 1 tab(s) orally once a day  aspirin 81 mg oral delayed release tablet: 1 tab(s) orally once a day  atorvastatin 20 mg oral tablet: 1 tab(s) orally once a day  Dexilant 60 mg oral delayed release capsule: 1 cap(s) orally once a day  famotidine 40 mg oral tablet: 1 tab(s) orally once a day  loratadine 10 mg oral tablet: 1 tab(s) orally once a day  meclizine 25 mg oral tablet: 1 tab(s) orally once a day as needed for  dizziness  methylcobalamin 1000 mcg oral tablet, chewable: 1 tab(s) chewed once a day  pantoprazole 40 mg oral delayed release tablet: 1 tab(s) orally 2 times a day  polyethylene glycol 3350 oral powder for reconstitution: 17 gram(s) orally once a day as needed for  constipation  senna leaf extract oral tablet: 2 tab(s) orally once a day (at bedtime)  tamsulosin 0.4 mg oral capsule: 1 cap(s) orally once a day   aluminum hydroxide-magnesium hydroxide 200 mg-200 mg/5 mL oral suspension: 30 milliliter(s) orally every 6 hours as needed for  indigestion  amLODIPine 2.5 mg oral tablet: 1 tab(s) orally once a day  aspirin 81 mg oral delayed release tablet: 1 tab(s) orally once a day  atorvastatin 20 mg oral tablet: 1 tab(s) orally once a day  Dexilant 60 mg oral delayed release capsule: 1 cap(s) orally once a day  famotidine 40 mg oral tablet: 1 tab(s) orally once a day  loratadine 10 mg oral tablet: 1 tab(s) orally once a day  meclizine 25 mg oral tablet: 1 tab(s) orally once a day as needed for  dizziness  methylcobalamin 1000 mcg oral tablet, chewable: 1 tab(s) chewed once a day  polyethylene glycol 3350 oral powder for reconstitution: 17 gram(s) orally once a day as needed for  constipation  senna leaf extract oral tablet: 2 tab(s) orally once a day (at bedtime)  tamsulosin 0.4 mg oral capsule: 1 cap(s) orally once a day

## 2025-06-09 NOTE — CONSULT NOTE ADULT - SUBJECTIVE AND OBJECTIVE BOX
Chief complaint/Reason for consult: epigastric discomfort    HPI:  74-year-old male with past medical history HTN, HLD, DM, reflux presents with complaint of dizziness and presyncope.  Reports at 1 PM today he got up from laying down and felt like he would pass out.  States he felt afterwards like he was weak and had chest pain while he was standing up.  Daughter Daniella at bedside states that patient has not felt well since the presyncopal episode.  States that patient reports he did not fully lose consciousness at any time.  Reports he had a headache earlier that is now resolved without intervention.  Denies focal numbness/weakness, abdominal pain, nausea/vomiting/diarrhea, change in bowel/bladder habits.    Home meds provided by daughter. (06 Jun 2025 23:57)     ID#[id number] used  GI Updates: 74yMale [facility] HTN, high cholesterol, DM presents for chest pain. GI consulted for epigastric discomfort, patient reports it has resolved now. Patient denies nausea, vomiting, hematemesis, melena, blood in stool, diarrhea, , abdominal pain. Patient reports EGD/Colonoscopy last year at [facility]. +constipation       PAST MEDICAL & SURGICAL HISTORY:   Hypertension  High cholesterol        Family history:  FAMILY HISTORY:    No GI cancers in first or second degree relatives    Social History: No smoking. No alcohol. No illegal drug use.    Allergies:  fish (Urticaria)  No Known Drug Allergies  Intolerances      MEDICATIONS: Home Medications:  aluminum hydroxide-magnesium hydroxide 200 mg-200 mg/5 mL oral suspension: 30 milliliter(s) orally every 6 hours as needed for  indigestion (09 Jun 2025 09:21)  amLODIPine 2.5 mg oral tablet: 1 tab(s) orally once a day (09 Jun 2025 09:21)  aspirin 81 mg oral delayed release tablet: 1 tab(s) orally once a day (09 Jun 2025 09:21)  atorvastatin 20 mg oral tablet: 1 tab(s) orally once a day (09 Jun 2025 09:21)  Dexilant 60 mg oral delayed release capsule: 1 cap(s) orally once a day (07 Jun 2025 00:36)  famotidine 40 mg oral tablet: 1 tab(s) orally once a day (09 Jun 2025 09:21)  loratadine 10 mg oral tablet: 1 tab(s) orally once a day (09 Jun 2025 09:21)  meclizine 25 mg oral tablet: 1 tab(s) orally once a day as needed for  dizziness (07 Jun 2025 00:47)  methylcobalamin 1000 mcg oral tablet, chewable: 1 tab(s) chewed once a day (09 Jun 2025 09:21)  polyethylene glycol 3350 oral powder for reconstitution: 17 gram(s) orally once a day as needed for  constipation (09 Jun 2025 09:21)  senna leaf extract oral tablet: 2 tab(s) orally once a day (at bedtime) (19 Mar 2024 14:30)  tamsulosin 0.4 mg oral capsule: 1 cap(s) orally once a day (16 Mar 2024 12:21)    MEDICATIONS  (STANDING):  amLODIPine   Tablet 2.5 milliGRAM(s) Oral daily  aspirin enteric coated 81 milliGRAM(s) Oral daily  atorvastatin 20 milliGRAM(s) Oral at bedtime  chlorhexidine 2% Cloths 1 Application(s) Topical <User Schedule>  enoxaparin Injectable 40 milliGRAM(s) SubCutaneous every 24 hours  lactated ringers. 1000 milliLiter(s) (75 mL/Hr) IV Continuous <Continuous>  pantoprazole    Tablet 40 milliGRAM(s) Oral before breakfast  polyethylene glycol 3350 17 Gram(s) Oral daily  senna 2 Tablet(s) Oral at bedtime  tamsulosin 0.4 milliGRAM(s) Oral at bedtime    MEDICATIONS  (PRN):  acetaminophen     Tablet .. 650 milliGRAM(s) Oral every 6 hours PRN Temp greater or equal to 38C (100.4F), Mild Pain (1 - 3)  aluminum hydroxide/magnesium hydroxide/simethicone Suspension 30 milliLiter(s) Oral every 4 hours PRN Dyspepsia  meclizine 25 milliGRAM(s) Oral daily PRN for dizziness  melatonin 3 milliGRAM(s) Oral at bedtime PRN Insomnia  ondansetron Injectable 4 milliGRAM(s) IV Push every 8 hours PRN Nausea and/or Vomiting        REVIEW OF SYSTEMS  General:  No weight loss, fevers, or chills.  Eyes:  No reported pain or visual changes  ENT:  No sore throat or runny nose.  NECK: No stiffness   CV:  No chest pain or palpitations.  Resp:  No shortness of breath, cough, wheezing or hemoptysis  GI:  No abdominal pain, nausea, vomiting, dysphagia, diarrhea  +constipation. No rectal bleeding, melena, or hematemesis.  Neuro:  No tingling, numbness       VITALS:   T(F): 98 (06-09-25 @ 04:06), Max: 98.1 (06-08-25 @ 14:00)  HR: 65 (06-09-25 @ 04:06) (60 - 71)  BP: 138/87 (06-09-25 @ 04:06) (115/80 - 152/93)  RR: 18 (06-09-25 @ 04:06) (18 - 18)  SpO2: 98% (06-09-25 @ 04:06) (97% - 98%)    PHYSICAL EXAM:  GENERAL: AAOx3, no acute distress.  HEAD:  Atraumatic, Normocephalic  EYES: conjunctiva and sclera clear  NECK: Supple, No thyromegaly   CHEST/LUNG: Clear to auscultation bilaterally; No wheeze, rhonchi, or rales  HEART: Regular rate and rhythm; normal S1, S2, No murmurs.  ABDOMEN: Soft, nontender, nondistended; Bowel sounds present  NEUROLOGY: No asterixis or tremor  SKIN: Intact, no jaundice          LABS:  06-09    137  |  100  |  9[L]  ----------------------------<  87  4.0   |  25  |  <0.5[L]    Ca    8.8      09 Jun 2025 08:12                            12.0   5.30  )-----------( 161      ( 09 Jun 2025 08:12 )             36.0           IMAGING:    < from: CT Angio Abdomen and Pelvis w/ IV Cont (06.06.25 @ 21:17) >    ACC: [id number] EXAM:  CT ANGIO CHEST AORTA WAWIC   ORDERED BY: GERARDO LEO     ACC: [id number] EXAM:  CT ANGIO ABD PELV (W)AW IC   ORDERED BY: GERARDO LEO     PROCEDURE DATE:  06/06/2025          INTERPRETATION:  CLINICAL INFORMATION: Chest pain; dizziness    COMPARISON: CT CHEST 3/16/2024.    CONTRAST/COMPLICATIONS:  IV Contrast: IV contrast documented in unlinked concurrent exam   (accession [id number]), Omnipaque 350 (accession [id number])  95 cc   administered   5 cc discarded  Oral Contrast: NONE.    PROCEDURE:  CT of the Chest, Abdomen and Pelvis was performed.  Sagittal and coronal reformats were performed.    FINDINGS:  CHEST:  LUNGS AND LARGE AIRWAYS: Bibasilar atelectasis. Patent central airways.   Left intrafissural nodule  PLEURA: No pleural effusion.  VESSELS: No thoracic aortic aneurysm or intramural hematoma  HEART: Cardiomegaly with right atrial enlargement. No pericardial   effusion.  MEDIASTINUM AND STU: No lymphadenopathy.  CHEST WALL AND LOWER NECK: Within normal limits.    ABDOMEN AND PELVIS:  LIVER: Stable arterial enhancing hepatic dome lesion, likely hemangioma  BILE DUCTS: Normal caliber.  GALLBLADDER: Within normal limits.  SPLEEN: Within normal limits.  PANCREAS: Within normal limits.  ADRENALS: Within normal limits.  KIDNEYS/URETERS: No renal stones or hydronephrosis. Renal cysts and other   subcentimeter hypodensities, too small to further characterize    BLADDER: Anterior urinary bladder wall diverticulum (sagittal series 605,   image 134)  REPRODUCTIVE ORGANS: Prostatomegaly with intravesicular extension    BOWEL: No bowel obstruction. Appendix is normal.  PERITONEUM/RETROPERITONEUM: Within normal limits.  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 Infrarenal abdominal aortic aneurysm measuring approximately   3.8 cm with eccentric mural thrombus (series 306, image 364), previously   3.3 cm. 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  LYMPH NODES: No lymphadenopathy.  ABDOMINAL WALL: Within normal limits.  BONES: Osteopenia. Partially visualized cervical spinal hardware.   Multilevel thoracolumbar degenerative changes with calcifications of the   anterior longitudinal ligament    IMPRESSION:  CHEST:  No thoracic aortic aneurysm, dissection or intramural hematoma.    ABDOMEN/PELVIS:  No intrarenal abdominal aorta dissection.    Infrarenal abdominal aortic aneurysm measuring 3.8 cm with eccentric   mural thrombus (previously 3.3 cm).    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    --- End of Report ---            BELA KOLB MD; Attending Radiologist  This document has been electronically signed. Jun 6 2025 10:46PM    < end of copied text >

## 2025-06-09 NOTE — CONSULT NOTE ADULT - CONSULT REASON
Bradycardia, near syncope
Aneurysm of Right common iliac artery 2.1 cm with eccentric mural thrombus
epigastric discomfort

## 2025-06-09 NOTE — DISCHARGE NOTE PROVIDER - NSDCCPCAREPLAN_GEN_ALL_CORE_FT
PRINCIPAL DISCHARGE DIAGNOSIS  Diagnosis: Dizziness  Assessment and Plan of Treatment: resolved  orthostatic vital signs initially positive, resolved after IVF   TTE showed____________  Cardiology placed MCOT   follow up with cardiology outpatient      SECONDARY DISCHARGE DIAGNOSES  Diagnosis: Gastric reflux  Assessment and Plan of Treatment: resolved  continue with home medications famotidine and dexilant   trial of maalox  outpatient Gastroenterology evaluation for EGD     PRINCIPAL DISCHARGE DIAGNOSIS  Diagnosis: Dizziness  Assessment and Plan of Treatment: resolved  orthostatic vital signs initially positive, resolved after IVF   TTE EF 55% / borderline pulmonary hypertension   Stress test __________  Cardiology placed MCOT   follow up with cardiology outpatient Dr. Rouse      SECONDARY DISCHARGE DIAGNOSES  Diagnosis: Gastric reflux  Assessment and Plan of Treatment: resolved  continue with home medications famotidine and dexilant , maalox  outpatient Gastroenterology evaluation for EGD  -Follow up with our [facility] located at [address] [place] [address]. Phone Number: [phone] for outpatient GI workup       PRINCIPAL DISCHARGE DIAGNOSIS  Diagnosis: Dizziness  Assessment and Plan of Treatment: resolved  orthostatic vital signs initially positive, resolved after IVF   TTE EF 55% / borderline pulmonary hypertension   Stress test negative   follow up with cardiology outpatient Dr. Rouse      SECONDARY DISCHARGE DIAGNOSES  Diagnosis: Gastric reflux  Assessment and Plan of Treatment: resolved  continue with home medications famotidine and dexilant , maalox  outpatient Gastroenterology evaluation for EGD  -Follow up with our [facility] located at [address] [place] [address]. Phone Number: [phone] for outpatient GI workup       PRINCIPAL DISCHARGE DIAGNOSIS  Diagnosis: Dizziness  Assessment and Plan of Treatment: resolved  orthostatic vital signs initially positive, resolved after IVF   TTE EF 55% / borderline pulmonary hypertension   Stress test negative   MCOT placed by cardiology   follow up with cardiology outpatient Dr. Rouse      SECONDARY DISCHARGE DIAGNOSES  Diagnosis: Gastric reflux  Assessment and Plan of Treatment: resolved  continue with home medications famotidine and dexilant , maalox  outpatient Gastroenterology evaluation for EGD  -Follow up with our [facility] located at [address] [place] [address]. Phone Number: [phone] for outpatient GI workup

## 2025-06-09 NOTE — DISCHARGE NOTE PROVIDER - CARE PROVIDERS DIRECT ADDRESSES
,[email or web address]

## 2025-06-09 NOTE — DISCHARGE NOTE PROVIDER - PROVIDER TOKENS
PROVIDER:[TOKEN:[[id number]]],PROVIDER:[TOKEN:[[id number]]],PROVIDER:[TOKEN:[[id number]]] PROVIDER:[TOKEN:[[id number]]],PROVIDER:[TOKEN:[[id number]]],PROVIDER:[TOKEN:[[id number]]],PROVIDER:[TOKEN:[[id number]]]

## 2025-06-10 ENCOUNTER — TRANSCRIPTION ENCOUNTER (OUTPATIENT)
Age: 75
End: 2025-06-10

## 2025-06-10 VITALS
TEMPERATURE: 98 F | OXYGEN SATURATION: 96 % | RESPIRATION RATE: 18 BRPM | HEART RATE: 77 BPM | DIASTOLIC BLOOD PRESSURE: 93 MMHG | SYSTOLIC BLOOD PRESSURE: 144 MMHG

## 2025-06-10 DIAGNOSIS — R42 DIZZINESS AND GIDDINESS: ICD-10-CM

## 2025-06-10 LAB
ANION GAP SERPL CALC-SCNC: 10 MMOL/L — SIGNIFICANT CHANGE UP (ref 7–14)
BASOPHILS # BLD AUTO: 0.02 K/UL — SIGNIFICANT CHANGE UP (ref 0–0.2)
BASOPHILS NFR BLD AUTO: 0.4 % — SIGNIFICANT CHANGE UP (ref 0–1)
BUN SERPL-MCNC: 9 MG/DL — LOW (ref 10–20)
CALCIUM SERPL-MCNC: 9.6 MG/DL — SIGNIFICANT CHANGE UP (ref 8.4–10.5)
CHLORIDE SERPL-SCNC: 101 MMOL/L — SIGNIFICANT CHANGE UP (ref 98–110)
CO2 SERPL-SCNC: 29 MMOL/L — SIGNIFICANT CHANGE UP (ref 17–32)
CREAT SERPL-MCNC: 0.6 MG/DL — LOW (ref 0.7–1.5)
EGFR: 100 ML/MIN/1.73M2 — SIGNIFICANT CHANGE UP
EGFR: 100 ML/MIN/1.73M2 — SIGNIFICANT CHANGE UP
EOSINOPHIL # BLD AUTO: 0.06 K/UL — SIGNIFICANT CHANGE UP (ref 0–0.7)
EOSINOPHIL NFR BLD AUTO: 1.1 % — SIGNIFICANT CHANGE UP (ref 0–8)
GLUCOSE SERPL-MCNC: 95 MG/DL — SIGNIFICANT CHANGE UP (ref 70–99)
HCT VFR BLD CALC: 41.8 % — LOW (ref 42–52)
HGB BLD-MCNC: 14 G/DL — SIGNIFICANT CHANGE UP (ref 14–18)
IMM GRANULOCYTES NFR BLD AUTO: 0.4 % — HIGH (ref 0.1–0.3)
LYMPHOCYTES # BLD AUTO: 0.79 K/UL — LOW (ref 1.2–3.4)
LYMPHOCYTES # BLD AUTO: 14.5 % — LOW (ref 20.5–51.1)
MAGNESIUM SERPL-MCNC: 2.3 MG/DL — SIGNIFICANT CHANGE UP (ref 1.8–2.4)
MCHC RBC-ENTMCNC: 31.4 PG — HIGH (ref 27–31)
MCHC RBC-ENTMCNC: 33.5 G/DL — SIGNIFICANT CHANGE UP (ref 32–37)
MCV RBC AUTO: 93.7 FL — SIGNIFICANT CHANGE UP (ref 80–94)
MONOCYTES # BLD AUTO: 0.35 K/UL — SIGNIFICANT CHANGE UP (ref 0.1–0.6)
MONOCYTES NFR BLD AUTO: 6.4 % — SIGNIFICANT CHANGE UP (ref 1.7–9.3)
NEUTROPHILS # BLD AUTO: 4.22 K/UL — SIGNIFICANT CHANGE UP (ref 1.4–6.5)
NEUTROPHILS NFR BLD AUTO: 77.2 % — HIGH (ref 42.2–75.2)
NRBC BLD AUTO-RTO: 0 /100 WBCS — SIGNIFICANT CHANGE UP (ref 0–0)
PLATELET # BLD AUTO: 190 K/UL — SIGNIFICANT CHANGE UP (ref 130–400)
PMV BLD: 9.5 FL — SIGNIFICANT CHANGE UP (ref 7.4–10.4)
POTASSIUM SERPL-MCNC: 4.3 MMOL/L — SIGNIFICANT CHANGE UP (ref 3.5–5)
POTASSIUM SERPL-SCNC: 4.3 MMOL/L — SIGNIFICANT CHANGE UP (ref 3.5–5)
RBC # BLD: 4.46 M/UL — LOW (ref 4.7–6.1)
RBC # FLD: 11.8 % — SIGNIFICANT CHANGE UP (ref 11.5–14.5)
SODIUM SERPL-SCNC: 140 MMOL/L — SIGNIFICANT CHANGE UP (ref 135–146)
WBC # BLD: 5.46 K/UL — SIGNIFICANT CHANGE UP (ref 4.8–10.8)
WBC # FLD AUTO: 5.46 K/UL — SIGNIFICANT CHANGE UP (ref 4.8–10.8)

## 2025-06-10 PROCEDURE — 99239 HOSP IP/OBS DSCHRG MGMT >30: CPT

## 2025-06-10 PROCEDURE — 93018 CV STRESS TEST I&R ONLY: CPT

## 2025-06-10 PROCEDURE — 78452 HT MUSCLE IMAGE SPECT MULT: CPT | Mod: 26

## 2025-06-10 PROCEDURE — 93016 CV STRESS TEST SUPVJ ONLY: CPT

## 2025-06-10 RX ADMIN — Medication 1 APPLICATION(S): at 05:30

## 2025-06-10 RX ADMIN — POLYETHYLENE GLYCOL 3350 17 GRAM(S): 17 POWDER, FOR SOLUTION ORAL at 17:10

## 2025-06-10 RX ADMIN — ENOXAPARIN SODIUM 40 MILLIGRAM(S): 100 INJECTION SUBCUTANEOUS at 16:12

## 2025-06-10 RX ADMIN — Medication 40 MILLIGRAM(S): at 05:29

## 2025-06-10 RX ADMIN — AMLODIPINE BESYLATE 2.5 MILLIGRAM(S): 10 TABLET ORAL at 05:29

## 2025-06-10 RX ADMIN — Medication 81 MILLIGRAM(S): at 16:13

## 2025-06-10 NOTE — DISCHARGE NOTE NURSING/CASE MANAGEMENT/SOCIAL WORK - FINANCIAL ASSISTANCE
[facility] provides services at a reduced cost to those who are determined to be eligible through [facility]’s financial assistance program. Information regarding [facility]’s financial assistance program can be found by going to [email or web address] or by calling [phone].

## 2025-06-10 NOTE — DISCHARGE NOTE NURSING/CASE MANAGEMENT/SOCIAL WORK - PATIENT PORTAL LINK FT
You can access the FollowMyHealth Patient Portal offered by [facility] by registering at the following website: [email or web address]. By joining [employer]’s FollowMyHealth portal, you will also be able to view your health information using other applications (apps) compatible with our system.

## 2025-06-10 NOTE — CHART NOTE - NSCHARTNOTEFT_GEN_A_CORE
As per RN the patient had a significant bowel movement at this time.  - will continue Miralax and Protonix as recommended by GI
[employer]  # [id number] was accessed  - the wife was unable to speak to daughter at this time: unavailable by phone  - the daughter will be at the hospital at 5PM today for discussion regarding [facility] placement
Please keep patient NPO p MN for pharmacological stress test in am of 6/10/2025  Discussed with patient and wife via  ID#[id number]/ [place]  Patient and wife in agreement with plan

## 2025-06-10 NOTE — DISCHARGE NOTE NURSING/CASE MANAGEMENT/SOCIAL WORK - NSDCPEFALRISK_GEN_ALL_CORE
For information on Fall & Injury Prevention, visit: [email or web address] OR  [email or web address] OR  https://www.cdc.gov/steadi/patient.html

## 2025-06-17 NOTE — PATIENT PROFILE ADULT - FALL HARM RISK - HARM RISK INTERVENTIONS
Not appear acutely infected.  Recommend continuing local wound care.   Assistance with ambulation/Assistance OOB with selected safe patient handling equipment/Communicate Risk of Fall with Harm to all staff/Discuss with provider need for PT consult/Monitor gait and stability/Provide patient with walking aids - walker, cane, crutches/Reinforce activity limits and safety measures with patient and family/Tailored Fall Risk Interventions/Use of alarms - bed, chair and/or voice tab/Visual Cue: Yellow wristband and red socks/Bed in lowest position, wheels locked, appropriate side rails in place/Call bell, personal items and telephone in reach/Instruct patient to call for assistance before getting out of bed or chair/Non-slip footwear when patient is out of bed/[place] to call system/Physically safe environment - no spills, clutter or unnecessary equipment/Purposeful Proactive Rounding/Room/bathroom lighting operational, light cord in reach

## 2025-06-19 DIAGNOSIS — R00.1 BRADYCARDIA, UNSPECIFIED: ICD-10-CM

## 2025-06-19 DIAGNOSIS — E11.9 TYPE 2 DIABETES MELLITUS WITHOUT COMPLICATIONS: ICD-10-CM

## 2025-06-19 DIAGNOSIS — I95.1 ORTHOSTATIC HYPOTENSION: ICD-10-CM

## 2025-06-19 DIAGNOSIS — E78.2 MIXED HYPERLIPIDEMIA: ICD-10-CM

## 2025-06-19 DIAGNOSIS — Z79.82 LONG TERM (CURRENT) USE OF ASPIRIN: ICD-10-CM

## 2025-06-19 DIAGNOSIS — K59.00 CONSTIPATION, UNSPECIFIED: ICD-10-CM

## 2025-06-19 DIAGNOSIS — D64.9 ANEMIA, UNSPECIFIED: ICD-10-CM

## 2025-06-19 DIAGNOSIS — I71.43 INFRARENAL ABDOMINAL AORTIC ANEURYSM, WITHOUT RUPTURE: ICD-10-CM

## 2025-06-19 DIAGNOSIS — K21.9 GASTRO-ESOPHAGEAL REFLUX DISEASE WITHOUT ESOPHAGITIS: ICD-10-CM

## 2025-06-19 DIAGNOSIS — I10 ESSENTIAL (PRIMARY) HYPERTENSION: ICD-10-CM

## 2025-06-19 DIAGNOSIS — I72.3 ANEURYSM OF ILIAC ARTERY: ICD-10-CM

## 2025-06-24 ENCOUNTER — APPOINTMENT (OUTPATIENT)
Dept: CARDIOLOGY | Facility: CLINIC | Age: 75
End: 2025-06-24
Payer: MEDICARE

## 2025-06-24 VITALS
OXYGEN SATURATION: 98 % | BODY MASS INDEX: 20.09 KG/M2 | WEIGHT: 128 LBS | SYSTOLIC BLOOD PRESSURE: 116 MMHG | HEART RATE: 78 BPM | HEIGHT: 67 IN | DIASTOLIC BLOOD PRESSURE: 80 MMHG

## 2025-06-24 PROBLEM — E78.5 HLD (HYPERLIPIDEMIA): Status: ACTIVE | Noted: 2025-06-24

## 2025-06-24 PROBLEM — I10 HTN (HYPERTENSION): Status: ACTIVE | Noted: 2025-06-24

## 2025-06-24 PROBLEM — R55 SYNCOPE: Status: ACTIVE | Noted: 2025-06-24

## 2025-06-24 PROCEDURE — 93000 ELECTROCARDIOGRAM COMPLETE: CPT

## 2025-06-24 PROCEDURE — G2211 COMPLEX E/M VISIT ADD ON: CPT

## 2025-06-24 PROCEDURE — 99204 OFFICE O/P NEW MOD 45 MIN: CPT

## 2025-06-24 RX ORDER — POLYETHYLENE GLYCOL 3350 17 G/17G
17 POWDER, FOR SOLUTION ORAL
Refills: 0 | Status: ACTIVE | COMMUNITY

## 2025-06-24 RX ORDER — MECOBALAMIN 1000 MCG
1000 TABLET,DISINTEGRATING SUBLINGUAL
Refills: 0 | Status: ACTIVE | COMMUNITY

## 2025-06-24 RX ORDER — ATORVASTATIN CALCIUM 20 MG/1
20 TABLET, FILM COATED ORAL
Refills: 0 | Status: ACTIVE | COMMUNITY

## 2025-06-24 RX ORDER — LIDOCAINE 4% 40 MG/G
4 PATCH TOPICAL
Refills: 0 | Status: ACTIVE | COMMUNITY

## 2025-06-24 RX ORDER — LORATADINE 10 MG/1
10 TABLET ORAL
Refills: 0 | Status: ACTIVE | COMMUNITY

## 2025-06-24 RX ORDER — MECLIZINE HYDROCHLORIDE 25 MG/1
25 TABLET ORAL
Refills: 0 | Status: ACTIVE | COMMUNITY

## 2025-06-24 RX ORDER — AMLODIPINE BESYLATE 2.5 MG/1
2.5 TABLET ORAL DAILY
Refills: 0 | Status: ACTIVE | COMMUNITY

## 2025-06-24 RX ORDER — CARBOXYMETHYLCELLULOSE SODIUM AND GLYCERIN 5; 9 MG/ML; MG/ML
0.5-0.9 SOLUTION/ DROPS OPHTHALMIC
Refills: 0 | Status: ACTIVE | COMMUNITY

## 2025-06-24 RX ORDER — PANCRELIPASE 36000; 180000; 114000 [USP'U]/1; [USP'U]/1; [USP'U]/1
36000-114000 CAPSULE, DELAYED RELEASE PELLETS ORAL
Refills: 0 | Status: ACTIVE | COMMUNITY

## 2025-06-24 RX ORDER — FAMOTIDINE 40 MG/1
40 TABLET, FILM COATED ORAL
Refills: 0 | Status: ACTIVE | COMMUNITY

## 2025-06-24 RX ORDER — SENNOSIDES 8.6 MG/1
CAPSULE, GELATIN COATED ORAL
Refills: 0 | Status: ACTIVE | COMMUNITY

## 2025-06-24 RX ORDER — ALUMINUM HYDROXIDE, MAGNESIUM HYDROXIDE, SIMETHICONE 400; 400; 40 MG/10ML; MG/10ML; MG/10ML
SUSPENSION ORAL
Refills: 0 | Status: ACTIVE | COMMUNITY

## 2025-06-24 RX ORDER — TAMSULOSIN HCL 0.4 MG
0.4 CAPSULE ORAL
Refills: 0 | Status: ACTIVE | COMMUNITY

## 2025-06-24 RX ORDER — DEXLANSOPRAZOLE 60 MG/1
60 CAPSULE, DELAYED RELEASE ORAL
Refills: 0 | Status: ACTIVE | COMMUNITY

## 2025-07-17 ENCOUNTER — APPOINTMENT (OUTPATIENT)
Dept: ELECTROPHYSIOLOGY | Facility: CLINIC | Age: 75
End: 2025-07-17
Payer: MEDICARE

## 2025-07-17 VITALS
HEIGHT: 67 IN | WEIGHT: 128 LBS | BODY MASS INDEX: 20.09 KG/M2 | SYSTOLIC BLOOD PRESSURE: 132 MMHG | HEART RATE: 68 BPM | DIASTOLIC BLOOD PRESSURE: 72 MMHG

## 2025-07-17 VITALS
SYSTOLIC BLOOD PRESSURE: 132 MMHG | DIASTOLIC BLOOD PRESSURE: 72 MMHG | BODY MASS INDEX: 20.09 KG/M2 | HEART RATE: 68 BPM | WEIGHT: 128 LBS | HEIGHT: 67 IN

## 2025-07-17 PROBLEM — Z78.9 DENIES ALCOHOL CONSUMPTION: Status: ACTIVE | Noted: 2025-07-17

## 2025-07-17 PROCEDURE — 99205 OFFICE O/P NEW HI 60 MIN: CPT

## 2025-07-17 PROCEDURE — 93000 ELECTROCARDIOGRAM COMPLETE: CPT

## 2025-07-17 RX ORDER — MECLIZINE HYDROCHLORIDE 25 MG/1
25 TABLET ORAL DAILY
Refills: 0 | Status: ACTIVE | COMMUNITY

## 2025-07-18 ENCOUNTER — APPOINTMENT (OUTPATIENT)
Dept: CARDIOLOGY | Facility: CLINIC | Age: 75
End: 2025-07-18
Payer: MEDICARE

## 2025-07-18 PROBLEM — R09.89 BRUIT: Status: ACTIVE | Noted: 2025-07-18

## 2025-07-18 PROCEDURE — 93880 EXTRACRANIAL BILAT STUDY: CPT

## 2025-07-21 PROBLEM — R42 DIZZINESS: Status: ACTIVE | Noted: 2025-06-24

## 2025-08-06 ENCOUNTER — APPOINTMENT (OUTPATIENT)
Age: 75
End: 2025-08-06